# Patient Record
Sex: FEMALE | Race: WHITE | NOT HISPANIC OR LATINO | Employment: FULL TIME | URBAN - METROPOLITAN AREA
[De-identification: names, ages, dates, MRNs, and addresses within clinical notes are randomized per-mention and may not be internally consistent; named-entity substitution may affect disease eponyms.]

---

## 2018-12-12 ENCOUNTER — HOSPITAL ENCOUNTER (EMERGENCY)
Facility: CLINIC | Age: 28
Discharge: HOME | End: 2018-12-12
Attending: INTERNAL MEDICINE | Admitting: EMERGENCY MEDICINE
Payer: COMMERCIAL

## 2018-12-12 VITALS
OXYGEN SATURATION: 96 % | WEIGHT: 175 LBS | SYSTOLIC BLOOD PRESSURE: 114 MMHG | HEART RATE: 76 BPM | RESPIRATION RATE: 16 BRPM | DIASTOLIC BLOOD PRESSURE: 81 MMHG | BODY MASS INDEX: 27.41 KG/M2 | TEMPERATURE: 97.9 F

## 2018-12-12 DIAGNOSIS — T78.40XA ACUTE ALLERGIC REACTION, INITIAL ENCOUNTER: ICD-10-CM

## 2018-12-12 PROCEDURE — 96375 TX/PRO/DX INJ NEW DRUG ADDON: CPT | Performed by: EMERGENCY MEDICINE

## 2018-12-12 PROCEDURE — 96374 THER/PROPH/DIAG INJ IV PUSH: CPT | Performed by: EMERGENCY MEDICINE

## 2018-12-12 PROCEDURE — 99284 EMERGENCY DEPT VISIT MOD MDM: CPT | Mod: Z6 | Performed by: EMERGENCY MEDICINE

## 2018-12-12 PROCEDURE — 25000132 ZZH RX MED GY IP 250 OP 250 PS 637: Performed by: EMERGENCY MEDICINE

## 2018-12-12 PROCEDURE — 25000125 ZZHC RX 250: Performed by: EMERGENCY MEDICINE

## 2018-12-12 PROCEDURE — 99284 EMERGENCY DEPT VISIT MOD MDM: CPT | Mod: 25 | Performed by: EMERGENCY MEDICINE

## 2018-12-12 PROCEDURE — 96361 HYDRATE IV INFUSION ADD-ON: CPT | Performed by: EMERGENCY MEDICINE

## 2018-12-12 PROCEDURE — 25000128 H RX IP 250 OP 636: Performed by: EMERGENCY MEDICINE

## 2018-12-12 RX ORDER — METHYLPREDNISOLONE SODIUM SUCCINATE 125 MG/2ML
125 INJECTION, POWDER, LYOPHILIZED, FOR SOLUTION INTRAMUSCULAR; INTRAVENOUS ONCE
Status: COMPLETED | OUTPATIENT
Start: 2018-12-12 | End: 2018-12-12

## 2018-12-12 RX ORDER — ONDANSETRON 2 MG/ML
4 INJECTION INTRAMUSCULAR; INTRAVENOUS EVERY 30 MIN PRN
Status: DISCONTINUED | OUTPATIENT
Start: 2018-12-12 | End: 2018-12-13 | Stop reason: HOSPADM

## 2018-12-12 RX ORDER — DIPHENHYDRAMINE HYDROCHLORIDE 50 MG/ML
50 INJECTION INTRAMUSCULAR; INTRAVENOUS ONCE
Status: COMPLETED | OUTPATIENT
Start: 2018-12-12 | End: 2018-12-12

## 2018-12-12 RX ORDER — SODIUM CHLORIDE 9 MG/ML
1000 INJECTION, SOLUTION INTRAVENOUS CONTINUOUS
Status: DISCONTINUED | OUTPATIENT
Start: 2018-12-12 | End: 2018-12-13 | Stop reason: HOSPADM

## 2018-12-12 RX ORDER — SODIUM CHLORIDE 9 MG/ML
INJECTION, SOLUTION INTRAVENOUS
Status: DISCONTINUED
Start: 2018-12-12 | End: 2018-12-13 | Stop reason: HOSPADM

## 2018-12-12 RX ADMIN — DIPHENHYDRAMINE HYDROCHLORIDE 50 MG: 50 INJECTION, SOLUTION INTRAMUSCULAR; INTRAVENOUS at 21:12

## 2018-12-12 RX ADMIN — ONDANSETRON 4 MG: 2 INJECTION INTRAMUSCULAR; INTRAVENOUS at 21:13

## 2018-12-12 RX ADMIN — METHYLPREDNISOLONE SODIUM SUCCINATE 125 MG: 125 INJECTION, POWDER, FOR SOLUTION INTRAMUSCULAR; INTRAVENOUS at 21:15

## 2018-12-12 RX ADMIN — SODIUM CHLORIDE 1000 ML: 9 INJECTION, SOLUTION INTRAVENOUS at 21:19

## 2018-12-12 RX ADMIN — LIDOCAINE HYDROCHLORIDE 30 MG: 20 SOLUTION ORAL; TOPICAL at 22:54

## 2018-12-12 ASSESSMENT — ENCOUNTER SYMPTOMS
VOMITING: 1
NAUSEA: 1

## 2018-12-12 NOTE — ED AVS SNAPSHOT
[facility], Emergency Department  [address]  [facility] [address]  Phone:  [phone]  Fax:  [phone]                                    Corine Gutierrez   MRN: [id number]    Department:  [facility], Emergency Department   Date of Visit:  12/12/2018           After Visit Summary Signature Page    I have received my discharge instructions, and my questions have been answered. I have discussed any challenges I see with this plan with the nurse or doctor.    ..........................................................................................................................................  Patient/Patient Representative Signature      ..........................................................................................................................................  Patient Representative Print Name and Relationship to Patient    ..................................................               ................................................  Date                                   Time    ..........................................................................................................................................  Reviewed by Signature/Title    ...................................................              ..............................................  Date                                               Time          22EPIC Rev 08/18

## 2018-12-13 NOTE — DISCHARGE INSTRUCTIONS
I recommend you take Benadryl 50 mg every 6-8 hours for the next 2 days  Return if any problems or concerns

## 2018-12-13 NOTE — ED PROVIDER NOTES
History     Chief Complaint   Patient presents with     Allergic Reaction     peanuts, throat swelling and vomiting. ordered mac and cheese and they put paul peanut sauce in it.      HPI  Corine Gutierrez is a 28 year old female with a history of a peanut allergy who presents to the Emergency Department for the evaluation of an allergic state. Patient states that 1 hour prior to arrival she ate a peanut and now complains of hives, a burning sensation in her mouth, nausea and vomiting. Patient states she took 75 mg Benadryl however vomited shortly after. She does have an EpiPen at home however did not use this.    I have reviewed the Medications, Allergies, Past Medical and Surgical History, and Social History in the Switchcam system.  Past Medical History:   Diagnosis Date     Allergic state        Past Surgical History:   Procedure Laterality Date     BIOPSY OF SKIN LESION       RESECT TUMOR LOWER EXTREMITY  4/10/2014    Procedure: RESECT TUMOR LOWER EXTREMITY;  Excision Left Fibula Tumor/exostosis;  Surgeon: Casimiro Rios MD;  Location: [facility] OR     wisdom teeth[         Family History   Problem Relation Age of Onset     Cancer No family hx of         No family history of skin cancer       Social History     Tobacco Use     Smoking status: Never Smoker     Smokeless tobacco: Never Used   Substance Use Topics     Alcohol use: Yes     Alcohol/week: 1.8 oz     Types: 3 Cans of beer per week     Comment: social drinker       No current facility-administered medications for this encounter.      Current Outpatient Medications   Medication     levothyroxine (LEVOTHROID) 50 MCG tablet     acetaminophen-codeine (TYLENOL/CODEINE #3) 300-30 MG per tablet     EPINEPHrine (EPIPEN) 0.3 MG/0.3ML injection     ibuprofen (ADVIL,MOTRIN) 600 MG tablet        Allergies   Allergen Reactions     Peanuts [Nuts] Nausea and Vomiting, Hives and Swelling     Abdominal pain  dizziness       Review of Systems   HENT:        Positive -  "mouth pain \"burning\"   Gastrointestinal: Positive for nausea and vomiting.   Skin: Positive for rash (hives).   All other systems reviewed and are negative.      Physical Exam   BP: (!) 133/98  Pulse: 76  Heart Rate: 72  Temp: 97.9  F (36.6  C)  Resp: 16  Weight: 79.4 kg (175 lb)  SpO2: 100 %      Physical Exam   Constitutional: She is oriented to person, place, and time. She appears well-developed and well-nourished. No distress.   HENT:   Head: Normocephalic and atraumatic.   Mouth/Throat: Oropharynx is clear and moist.   Eyes: Conjunctivae and EOM are normal. Pupils are equal, round, and reactive to light. No scleral icterus.   Neck: Neck supple.   Cardiovascular: Normal rate, regular rhythm and normal heart sounds.   Pulmonary/Chest: Effort normal and breath sounds normal. No respiratory distress. She has no wheezes.   Neurological: She is alert and oriented to person, place, and time. No cranial nerve deficit.   Skin: Skin is warm and dry. Rash noted.   Psychiatric: She has a normal mood and affect. Her behavior is normal.   Nursing note and vitals reviewed.      ED Course        Procedures        Medications   diphenhydrAMINE (BENADRYL) injection 50 mg (50 mg Intravenous Given 12/12/18 2112)   0.9% sodium chloride BOLUS (0 mLs Intravenous Stopped 12/12/18 2215)   methylPREDNISolone sodium succinate (solu-MEDROL) injection 125 mg (125 mg Intravenous Given 12/12/18 2115)   lidocaine VISCOUS (XYLOCAINE) 2 % 15 mL, alum & mag hydroxide-simethicone (MYLANTA ES/MAALOX  ES) 15 mL GI Cocktail (30 mg Oral Given 12/12/18 2254)            Labs Ordered and Resulted from Time of ED Arrival Up to the Time of Departure from the ED - No data to display         Assessments & Plan (with Medical Decision Making)   Corine Gutierrez is a 28 year old female with acute allergic reaction after inadvertently eating something containing peanuts.  Here she received a dose of Solu-Medrol and IV Benadryl. It has been 3 hours now, she feels " fine, has no wheezing, no swelling, no hives.  We will discharge home with recommendation to take 50 mg of Benadryl every 6-8 hours. Return if any problems or concerns.  She did have some epigastric discomfort and nausea and vomiting; her abdominal exam was unconcerning    I have reviewed the nursing notes.    I have reviewed the findings, diagnosis, plan and need for follow up with the patient.       Medication List      There are no discharge medications for this visit.         Final diagnoses:   Acute allergic reaction, initial encounter     I, Phil James, am serving as a trained medical scribe to document services personally performed by Chris Gasca MD, based on the provider's statements to me.   I, Chris Gasca MD, was physically present and have reviewed and verified the accuracy of this note documented by Phil James.    12/12/2018   [facility], [place], EMERGENCY DEPARTMENT     Kvng Gasca MD  12/20/18 1500

## 2022-02-21 ENCOUNTER — ANCILLARY PROCEDURE (OUTPATIENT)
Dept: ULTRASOUND IMAGING | Facility: CLINIC | Age: 32
End: 2022-02-21
Attending: NURSE PRACTITIONER
Payer: COMMERCIAL

## 2022-02-21 ENCOUNTER — TRANSFERRED RECORDS (OUTPATIENT)
Dept: HEALTH INFORMATION MANAGEMENT | Facility: CLINIC | Age: 32
End: 2022-02-21

## 2022-02-21 DIAGNOSIS — E03.9 HYPOTHYROID: ICD-10-CM

## 2022-02-21 LAB
CHOLESTEROL (EXTERNAL): 156 MG/DL
HDLC SERPL-MCNC: 66 MG/DL (ref 35–999)
LDL CHOLESTEROL (EXTERNAL): 79 MG/DL (ref 0–130)
TRIGLYCERIDES (EXTERNAL): 54 MG/DL (ref 20–150)
TSH SERPL-ACNC: 3.26 MIU/L (ref 0.4–4.5)

## 2022-02-21 PROCEDURE — 76536 US EXAM OF HEAD AND NECK: CPT | Performed by: RADIOLOGY

## 2022-02-22 ENCOUNTER — MEDICAL CORRESPONDENCE (OUTPATIENT)
Dept: HEALTH INFORMATION MANAGEMENT | Facility: CLINIC | Age: 32
End: 2022-02-22
Payer: COMMERCIAL

## 2022-02-22 ENCOUNTER — TRANSFERRED RECORDS (OUTPATIENT)
Dept: HEALTH INFORMATION MANAGEMENT | Facility: CLINIC | Age: 32
End: 2022-02-22
Payer: COMMERCIAL

## 2022-02-25 ENCOUNTER — TRANSFERRED RECORDS (OUTPATIENT)
Dept: HEALTH INFORMATION MANAGEMENT | Facility: CLINIC | Age: 32
End: 2022-02-25
Payer: COMMERCIAL

## 2022-02-28 ENCOUNTER — TELEPHONE (OUTPATIENT)
Dept: ENDOCRINOLOGY | Facility: CLINIC | Age: 32
End: 2022-02-28
Payer: COMMERCIAL

## 2022-02-28 NOTE — TELEPHONE ENCOUNTER
Endocrine triage  The scheduled first available endocrine appointment timeframe is acceptable  Genesis Dasilva MD

## 2022-02-28 NOTE — TELEPHONE ENCOUNTER
[facility] Call Center    Phone Message    May a detailed message be left on voicemail: yes     Reason for Call: Appointment Intake    Referring Provider Name: Dr. Nathalie Amaro from [facility]  Diagnosis and/or Symptoms: Hypothyroidism due to Hashimoto's thyroiditis, Thyroid nodule    Pt is scheduled for FNA on 03/11    I was able to schedule pt with Dr. Dasilva on 05/04    Protocols state to send encounter if appt is scheduled over 2 weeks    Please triage    Action Taken: Message routed to:  [facility] ([facility] Endo    Travel Screening: Not Applicable

## 2022-02-28 NOTE — TELEPHONE ENCOUNTER
APPT NOTES: Thyroid Nodule    For Cancer Patients: Need the original operative and surgical pathology reports and all imaging reports/images related to the disease (includes all thyroid US, nuclear thyroid and total body scans, PET scans, chest CT reports since prior to the diagnosis ).   APPT DATE: 5/4/2022   NOTES (FOR ALL VISITS) STATUS DETAILS   OFFICE NOTES from referring provider Received [facility]:  2/22/22 - [facility] OV with Dr. Amaro   OFFICE NOTES from other specialist Care Everywhere [facility]:  2/20/19 - [facility] OV with Dr. Novak  1/9/18 - OBGYN OV with Olya Damon NP  11/24/09 - [facility] OV with DENAE Machado   ED NOTES N/A    OPERATIVE REPORT  (thyroid, pituitary, adrenal, parathyroid)  (All op notes related to diagnoses) N/A    MEDICATION LIST Received    IMAGING      ULTRASOUND (HEAD/NECK)  * Include FNAs Received / Internal [facility]:  3/11/22 - US Thyroid FNA  2/21/22 - US Thyroid    [facility]:  9/1/09 - US Thyroid/Neck   LABS     DIABETES: HBGA1C, CREATININE, FASTING LIPIDS, MICROALBUMIN URINE, POTASSIUM, TSH, T4    THYROID: TSH, T4, CBC, THYRODLONULIN, TOTAL T3, FREE T4, CALCITONIN, CEA Received / Internal [facility]:  2/22/22 - Thyroid Peroxidase  2/22/22 - Thyroglobulin  2/21/22 - TSH, T4  2/21/22 - CBC  2/21/22 - Lipid    [facility]:  5/3/16 - BMP  5/3/16 - Routine UA   PATHOLOGY REPORTS WITH CASE NUMBER  *Surgical path reports for endocrine organs (ovaries, testes, pancreas, etc) Internal [facility]:  3/11/22 - Thyroid FNA (Case: [id number])     Records Requested  02/28/22    Facility  [facility]  Fax: [phone]  [facility]  Fax: [phone]   Outcome * 2/28/22 11:39 AM Faxed req to [facility] for records to be faxed to the clinic. - Perri    * 3/21/22 8 AM Faxed req to  for images to be pushed to [place] PACs. - Perri    * 4/4/22 8:18 AM Images received from  and attached to the patient in PACs. - Perri

## 2022-02-28 NOTE — TELEPHONE ENCOUNTER
New referral  Hypothyroid  Hashimoto and thyroid nodule.   Labs done 2/22 TSH 3.26 ,  and  Thyroid antibodies 11.  Thyroid nodule biopsy will be 3/11 with first visit 5/ 2022 in Endocrine . Is  ok to wait until May to be seen ? Ofelia Calderon RN on 2/28/2022 at 12:35 PM

## 2022-03-11 ENCOUNTER — ANCILLARY PROCEDURE (OUTPATIENT)
Dept: ULTRASOUND IMAGING | Facility: CLINIC | Age: 32
End: 2022-03-11
Attending: INTERNAL MEDICINE
Payer: COMMERCIAL

## 2022-03-11 DIAGNOSIS — E04.1 THYROID NODULE: ICD-10-CM

## 2022-03-11 PROCEDURE — 88173 CYTOPATH EVAL FNA REPORT: CPT | Mod: 26 | Performed by: PATHOLOGY

## 2022-03-11 PROCEDURE — 10005 FNA BX W/US GDN 1ST LES: CPT | Performed by: STUDENT IN AN ORGANIZED HEALTH CARE EDUCATION/TRAINING PROGRAM

## 2022-03-11 PROCEDURE — 88173 CYTOPATH EVAL FNA REPORT: CPT | Mod: TC

## 2022-03-11 PROCEDURE — 88172 CYTP DX EVAL FNA 1ST EA SITE: CPT | Mod: 26 | Performed by: PATHOLOGY

## 2022-03-11 RX ORDER — LIDOCAINE HYDROCHLORIDE 10 MG/ML
5 INJECTION, SOLUTION INFILTRATION; PERINEURAL ONCE
Status: COMPLETED | OUTPATIENT
Start: 2022-03-11 | End: 2022-03-11

## 2022-03-11 RX ADMIN — LIDOCAINE HYDROCHLORIDE 3 ML: 10 INJECTION, SOLUTION INFILTRATION; PERINEURAL at 11:17

## 2022-03-14 LAB
PATH REPORT.COMMENTS IMP SPEC: NORMAL
PATH REPORT.FINAL DX SPEC: NORMAL
PATH REPORT.GROSS SPEC: NORMAL
PATH REPORT.MICROSCOPIC SPEC OTHER STN: NORMAL
PATH REPORT.RELEVANT HX SPEC: NORMAL

## 2022-05-04 ENCOUNTER — TELEPHONE (OUTPATIENT)
Dept: ENDOCRINOLOGY | Facility: CLINIC | Age: 32
End: 2022-05-04

## 2022-05-04 ENCOUNTER — PRE VISIT (OUTPATIENT)
Dept: ENDOCRINOLOGY | Facility: CLINIC | Age: 32
End: 2022-05-04

## 2022-05-04 ENCOUNTER — VIRTUAL VISIT (OUTPATIENT)
Dept: ENDOCRINOLOGY | Facility: CLINIC | Age: 32
End: 2022-05-04
Payer: COMMERCIAL

## 2022-05-04 DIAGNOSIS — E03.8 SUBCLINICAL HYPOTHYROIDISM: ICD-10-CM

## 2022-05-04 DIAGNOSIS — E04.1 THYROID NODULE: Primary | ICD-10-CM

## 2022-05-04 PROCEDURE — 99205 OFFICE O/P NEW HI 60 MIN: CPT | Mod: 95

## 2022-05-04 RX ORDER — LEVOTHYROXINE SODIUM 75 UG/1
75 TABLET ORAL DAILY
Qty: 90 TABLET | Refills: 4 | Status: SHIPPED | OUTPATIENT
Start: 2022-05-04 | End: 2023-05-08 | Stop reason: DRUGHIGH

## 2022-05-04 NOTE — PROGRESS NOTES
Endocrine Consult Video visit note-     Attending Assessment/Plan :     Subclinical hypothyroidism, autoimmune thyroid disease (AITD).  The lab and US appearances are consistent with the AITD .On LT4 50 mc/gday.   Treat to target TSh < 2.5 now, possibly to < 1.8 if planning pregnancy  Increase LT4 to 75 mcg/day. Repeat labs 2  months  I have counseled her on pregnancy and thyroid , for future reference.     Thyroid nodules: The right dominant nodule had benign cytology.  It looks like what we call white peterson in Hashimoto thyroiditis. Given that it is new since 2009 and now 3.3 cm it is possible that further growth would lead to intervention.   Continue to monitor  Repeat US one year and see me afterwards.  Let us know if concern before then .    Addendum  5/1/24 thyroid US compared with  2/21/2022,  9/1/2009   Hypoechoic heterogeneous gland with white peterson nodules  Right nodule  1 iso/hyperechoic, taller than wide  3.1 x 3.4 x 3.6 (19 CM) cm was 2.8 x 3.3 x 3 (13.9 CM3) inferior posterior, new since 2009 - FNAB 3/11/2022 -- the nodule increased 37% since 2022.   Left nodule # 2 hyperechoic  1.1 x 1 x 1.2 cm was 1 x 1.2 x 1.5 cm new since 2009  Left # 3 0.5 x 0.3 x 0.7 cm   Isthmus hyperechoic # 4  0.5 x 0.4 x 0.6 cm   Inferior to left thyroid # 1  0.5 x 0.5 x 0.9 cmwas 0.7 x 0.6 x 0.9 new since 2009    Waking up choking . Discussed.  Could this be due to the thyroid?  Keep an eye on it.    Usually I wouldn't expect nodular goiter to cause choking when it is not substernal. I can see the lower limits of the gland on the US cine so I do not believe this is substernal.     Due to the COVID 19 pandemic this visit was a video visit in order to help prevent spread of infection in this patient and the general population. The patient gave verbal consent for the visit today. I have independently reviewed and interpreted labs, imaging as indicated.     Chart review/prep time 1  [id number]  Visit Start time amwel 1658    Visit Stop time  1252   41__ minutes spent on the date of the encounter doing chart review, history and exam, documentation and further activities as noted above.    Genesis Dasilva MD    Chief complaint:  Corine is a 31 year old female seen in consultation at the request of Dr Nathalie Amaro for thyroid nodule in the context of hypothyroidsim due to Hashimoto thyroiditis  I have reviewed Care Everywhere including [facility] lab reports, imaging reports and provider notes as indicated.      HISTORY OF PRESENT ILLNESS    Hypothyroid diagnosis was in  2009,  age 19.  She recalls that she had been at the 90th percentile for weight since puberty.  She was tired throughout highschool.  When she transitioned from pediatro to adult medicine she was found to have goiter   She has been on the current dose of Levothryxoine since 2009.   Her father noticed the goiter last fall, thought the goiter was getting bigger.  She can kind of feel the right side with swallow.  She also can feel it with her hand.    She has no neck/head position symptoms.  She wakes up choking in the middle of the night - this just started this year - this occurs about once/week.      Endocrine relevant labs are as follows:  8/28/09 TSH 6.06, free T4 0.9  9/24/2009 TSh 6.44, free T4 0.9  10/28/2009 TSh 2.84   2/20/19 TSh 4.01  2/21/2022 TSh 3.26  2/22/2022  (< 9), MELODY 11 (< 1), ferritin 12    Relevant imaging is as follows: (as read by me as it pertains to endocrine relevant organs)  2/21/2022 thyroid US: compared with 9/1/2009   Hypoechoic heterogeneous gland with white peterson nodules  Right nodule  1 iso/hyperechoic, taller than wide 2.8 x 3.3 x 3 inferior posterior, new since 2009 - FNAB 3/11/2022   Left nodule # 2 hyperechoic  1 x 1.2 x 1.5 cm new since 2009  Inferior to left thyroid # 1 0.7 x 0.6 x 0.9 new since 2009    REVIEW OF SYSTEMS  Weight stable for many years  Energy OK  - somewhat tired, could nap most days.    Voice is  normal  Cardiac: negative  Respiratory: negatifve  GI: irregular always ;   Menarche around 16 or 17 years of age.    Menses never regular; she now has nexplanon;   No planned pregnancy in the near future.    10 system ROS otherwise as per the HPI or negative    Past Medical History  Past Medical History:   Diagnosis Date    Allergic state     Premenstrual dysphoric disorder     Subclinical hypothyroidism 2009     Past Surgical History:   Procedure Laterality Date    BIOPSY OF SKIN LESION      RESECT TUMOR LOWER EXTREMITY  4/10/2014    Procedure: RESECT TUMOR LOWER EXTREMITY;  Excision Left Fibula Tumor/exostosis;  Surgeon: Casimiro Rios MD;  Location: [facility] OR    wisdom teeth[         Medications  Current Outpatient Medications   Medication Sig Dispense Refill    EPINEPHrine (EPIPEN) 0.3 MG/0.3ML injection Inject 0.3 mg into the muscle once as needed      ibuprofen (ADVIL,MOTRIN) 600 MG tablet Take 1 tablet (600 mg) by mouth every 8 hours as needed for moderate pain 20 tablet 0    levothyroxine (SYNTHROID/LEVOTHROID) 75 MCG tablet Take 1 tablet (75 mcg) by mouth daily 90 tablet 4    acetaminophen-codeine (TYLENOL/CODEINE #3) 300-30 MG per tablet Take 1-2 tablets by mouth every 6 hours as needed for pain 12 tablet 0       Allergies  Allergies   Allergen Reactions    Peanut-Derived Hives    Peanuts [Nuts] Nausea and Vomiting, Hives and Swelling     Abdominal pain  dizziness       Family History  Family History   Problem Relation Age of Onset    Diabetes Type 2  Father     Thyroid Disease Maternal Grandfather     Colon Cancer Maternal Grandfather     Diabetes Type 1 Paternal Grandmother     Thyroid Disease Paternal Aunt     Cancer No family hx of         No family history of skin cancer     Social History  Social History     Tobacco Use    Smoking status: Never Smoker    Smokeless tobacco: Never Used   Substance Use Topics    Alcohol use: Yes     Alcohol/week: 3.0 standard drinks     Types: 3 Cans of beer per  "week     Comment: social drinker    Drug use: No     Was in grad school for doctorate in nursing; 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   BP Readings from Last 1 Encounters:   12/12/18 114/81      Pulse Readings from Last 1 Encounters:   12/12/18 76      Resp Readings from Last 1 Encounters:   12/12/18 16      Temp Readings from Last 1 Encounters:   12/12/18 97.9  F (36.6  C) (Oral)      SpO2 Readings from Last 1 Encounters:   12/12/18 96%      Wt Readings from Last 1 Encounters:   12/12/18 79.4 kg (175 lb)      Ht Readings from Last 1 Encounters:   05/03/16 1.702 m (5' 7\")     GENERAL: pleasant young woman in no distress; I can see from neck up  SKIN: Visible skin clear. No significant rash, abnormal pigmentation or lesions.  EYES: glasses; Eyes grossly normal to inspection.  No discharge or erythema, or obvious scleral/conjunctival abnormalities.  NECK: visible goiter is subtle;   RESP: No audible wheeze, cough, or visible cyanosis.  No visible retractions or increased work of breathing.    NEURO: Awake, alert, responds appropriately to questions.  Mentation and speech fluent.  PSYCH:affect normal and appearance well-groomed.    DATA REVIEW    ENDO THYROID LABS-[facility] Latest Ref Rng & Units 2/21/2022   TSH (EXTERNAL) 0.40 - 4.50 mIU/L 3.26     3/11/2022 11:21 AM    Status: Final result    Visible to patient: No (inaccessible in MyChart)    0 Result Notes    Component    Final Diagnosis   Specimen A                 Interpretation:                  Benign  Consistent with a benign nodule (includes adenomatoid nodule, colloid nodule, etc.)     The [place] implied risk of malignancy and recommended clinical management:  Benign has a 0-3% risk of malignancy, recommended management is clinical follow-up.                  Adequacy:                 Satisfactory for evaluation         Electronically signed by Bryon Hall III, MD on " 3/14/2022 at  1:03 PM   Comment           Clinical Information    Thyroid Rt inf #1, 3.3cm   Rapid Onsite Evaluation    FNA Performance:   Fine needle aspiration was not performed by [facility] staff.     Aspirate immediate study/adequacy:  HENRIQUE MARTINEZ KHALID, MD, attest that I immediately examined smears while the procedure was underway and determined or confirmed the adequacy of the specimens via telepathology.     It is of note that the final assessment and report may be performed and signed by a different pathologist.     Onsite adequacy/interpretation:  A: adequate         Gross Description    A. Thyroid, Lower Lobe, Right, Inferior #1 3.3cm, Fine Needle Aspirate:  Received are 3 fixed slides, processed for Pap stain, and 3 air dried slides, processed for Diff Quik stain. Afirma tube held.                     Microscopic Description    Microscopic examination was performed.     A resident/fellow in an ACGME accredited training program was involved in the initial review, preparation, and/or interpretation of this case.  Kyree MARTINEZ MD, as the senior physician, attest that I: (i) reviewed patient clinical records if indicated; (ii) reviewed relevant lab test results; (iii) examined the relevant preparation(s) for the specimen(s); and (iv) agree with the report, diagnosis(es), and interpretation as documented by the resident/fellow and edited/confirmed by me. Reporting resident/fellow: [email or web address]   Performing Labs    The technical component of this testing was completed at [facility]

## 2022-05-04 NOTE — LETTER
5/4/2022       RE: Corine Gutierrez  [address]  [facility] [address]     Dear Colleague,    Thank you for referring your patient, Corine Gutierrez, to the [facility] [place] [facility]. Please see a copy of my visit note below.    Endocrine Consult Video visit note-     Attending Assessment/Plan :     Subclinical hypothyroidism, autoimmune thyroid disease (AITD).  The lab and US appearances are consistent with the AITD .On LT4 50 mc/gday.   Treat to target TSh < 2.5 now, possibly to < 1.8 if planning pregnancy  Increase LT4 to 75 mcg/day. Repeat labs 2  months  I have counseled her on pregnancy and thyroid , for future reference.     Thyroid nodules: The right dominant nodule had benign cytology.  It looks like what we call white peterson in Hashimoto thyroiditis. Given that it is new since 2009 and now 3.3 cm it is possible that further growth would lead to intervention.   Continue to monitor  Repeat US one year and see me afterwards.  Let us know if concern before then .    Waking up choking . Discussed.  Could this be due to the thyroid?  Keep an eye on it.    Usually I wouldn't expect nodular goiter to cause choking when it is not substernal. I can see the lower limits of the gland on the US cine so I do not believe this is substernal.     Due to the COVID 19 pandemic this visit was a video visit in order to help prevent spread of infection in this patient and the general population. The patient gave verbal consent for the visit today. I have independently reviewed and interpreted labs, imaging as indicated.     Chart review/prep time 1  [id number]  Visit Start time amwel 1654   Visit Stop time  1711   41__ minutes spent on the date of the encounter doing chart review, history and exam, documentation and further activities as noted above.    Genesis Dasilva MD    Chief complaint:  Corine is a 31 year old  female seen in consultation at the request of Dr Nathalie Amaro for thyroid nodule in the context of hypothyroidsim due to Hashimoto thyroiditis  I have reviewed Care Everywhere including [facility] lab reports, imaging reports and provider notes as indicated.      HISTORY OF PRESENT ILLNESS    Hypothyroid diagnosis was in  2009,  age 19.  She recalls that she had been at the 90th percentile for weight since puberty.  She was tired throughout highschool.  When she transitioned from pediatro to adult medicine she was found to have goiter   She has been on the current dose of Levothryxoine since 2009.   Her father noticed the goiter last fall, thought the goiter was getting bigger.  She can kind of feel the right side with swallow.  She also can feel it with her hand.    She has no neck/head position symptoms.  She wakes up choking in the middle of the night - this just started this year - this occurs about once/week.      Endocrine relevant labs are as follows:  8/28/09 TSH 6.06, free T4 0.9  9/24/2009 TSh 6.44, free T4 0.9  10/28/2009 TSh 2.84   2/20/19 TSh 4.01  2/21/2022 TSh 3.26  2/22/2022  (< 9), MELODY 11 (< 1), ferritin 12    Relevant imaging is as follows: (as read by me as it pertains to endocrine relevant organs)  2/21/2022 thyroid US: compared with 9/1/2009   Hypoechoic heterogeneous gland with white peterson nodules  Right nodule  1 iso/hyperechoic, taller than wide 2.8 x 3.3 x 3 inferior posterior, new since 2009 - FNAB 3/11/2022   Left nodule # 2 hyperechoic  1 x 1.2 x 1.5 cm new since 2009  Inferior to left thyroid # 1 0.7 x 0.6 x 0.9 new since 2009    REVIEW OF SYSTEMS  Weight stable for many years  Energy OK  - somewhat tired, could nap most days.    Voice is normal  Cardiac: negative  Respiratory: negatifve  GI: irregular always ;   Menarche around 16 or 17 years of age.    Menses never regular; she now has nexplanon;   No planned pregnancy in the near future.    10 system ROS otherwise as  per the HPI or negative    Past Medical History  Past Medical History:   Diagnosis Date     Allergic state      Premenstrual dysphoric disorder      Subclinical hypothyroidism 2009     Past Surgical History:   Procedure Laterality Date     BIOPSY OF SKIN LESION       RESECT TUMOR LOWER EXTREMITY  4/10/2014    Procedure: RESECT TUMOR LOWER EXTREMITY;  Excision Left Fibula Tumor/exostosis;  Surgeon: Casimiro Rios MD;  Location: [facility] OR     wisdom teeth[         Medications  Current Outpatient Medications   Medication Sig Dispense Refill     EPINEPHrine (EPIPEN) 0.3 MG/0.3ML injection Inject 0.3 mg into the muscle once as needed       ibuprofen (ADVIL,MOTRIN) 600 MG tablet Take 1 tablet (600 mg) by mouth every 8 hours as needed for moderate pain 20 tablet 0     levothyroxine (SYNTHROID/LEVOTHROID) 75 MCG tablet Take 1 tablet (75 mcg) by mouth daily 90 tablet 4     acetaminophen-codeine (TYLENOL/CODEINE #3) 300-30 MG per tablet Take 1-2 tablets by mouth every 6 hours as needed for pain 12 tablet 0       Allergies  Allergies   Allergen Reactions     Peanut-Derived Hives     Peanuts [Nuts] Nausea and Vomiting, Hives and Swelling     Abdominal pain  dizziness       Family History  Family History   Problem Relation Age of Onset     Diabetes Type 2  Father      Thyroid Disease Maternal Grandfather      Colon Cancer Maternal Grandfather      Diabetes Type 1 Paternal Grandmother      Thyroid Disease Paternal Aunt      Cancer No family hx of         No family history of skin cancer     Social History  Social History     Tobacco Use     Smoking status: Never Smoker     Smokeless tobacco: Never Used   Substance Use Topics     Alcohol use: Yes     Alcohol/week: 3.0 standard drinks     Types: 3 Cans of beer per week     Comment: social drinker     Drug use: No     Was in grad school for doctorate in nursing; 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   BP Readings from Last 1 Encounters:   12/12/18 114/81      Pulse Readings from Last 1 Encounters:   12/12/18 76      Resp Readings from Last 1 Encounters:   12/12/18 16      Temp Readings from Last 1 Encounters:   12/12/18 97.9  F (36.6  C) (Oral)      SpO2 Readings from Last 1 Encounters:   12/12/18 96%      Wt Readings from Last 1 Encounters:   12/12/18 79.4 kg (175 lb)      Ht Readings from Last 1 Encounters:   05/03/16 1.702 m (5' 7\")     GENERAL: pleasant young woman in no distress; I can see from neck up  SKIN: Visible skin clear. No significant rash, abnormal pigmentation or lesions.  EYES: glasses; Eyes grossly normal to inspection.  No discharge or erythema, or obvious scleral/conjunctival abnormalities.  NECK: visible goiter is subtle;   RESP: No audible wheeze, cough, or visible cyanosis.  No visible retractions or increased work of breathing.    NEURO: Awake, alert, responds appropriately to questions.  Mentation and speech fluent.  PSYCH:affect normal and appearance well-groomed.    DATA REVIEW    ENDO THYROID LABS-P Latest Ref Rng & Units 2/21/2022   TSH (EXTERNAL) 0.40 - 4.50 mIU/L 3.26      3/11/2022 11:21 AM     Status: Final result     Visible to patient: No (inaccessible in MyChart)     0 Result Notes    Component    Final Diagnosis   Specimen A                 Interpretation:                  Benign  Consistent with a benign nodule (includes adenomatoid nodule, colloid nodule, etc.)     The [place] implied risk of malignancy and recommended clinical management:  Benign has a 0-3% risk of malignancy, recommended management is clinical follow-up.                  Adequacy:                 Satisfactory for evaluation         Electronically signed by Bryon Hall III, MD on 3/14/2022 at  1:03 PM   Comment           Clinical Information    Thyroid Rt inf #1, 3.3cm   Rapid Onsite Evaluation    FNA Performance:   Fine needle aspiration was not performed by [facility] " staff.     Aspirate immediate study/adequacy:  IHENRIQUE KHALID, MD, attest that I immediately examined smears while the procedure was underway and determined or confirmed the adequacy of the specimens via telepathology.     It is of note that the final assessment and report may be performed and signed by a different pathologist.     Onsite adequacy/interpretation:  A: adequate         Gross Description    A. Thyroid, Lower Lobe, Right, Inferior #1 3.3cm, Fine Needle Aspirate:  Received are 3 fixed slides, processed for Pap stain, and 3 air dried slides, processed for Diff Quik stain. Afirma tube held.                     Microscopic Description    Microscopic examination was performed.     A resident/fellow in an ACGME accredited training program was involved in the initial review, preparation, and/or interpretation of this case.  Kyree MARTINEZ MD, as the senior physician, attest that I: (i) reviewed patient clinical records if indicated; (ii) reviewed relevant lab test results; (iii) examined the relevant preparation(s) for the specimen(s); and (iv) agree with the report, diagnosis(es), and interpretation as documented by the resident/fellow and edited/confirmed by me. Reporting resident/fellow: [email or web address]   Performing Labs    The technical component of this testing was completed at [facility]              Corine Gutierrez is being evaluated via a billable video visit.    How would you like to obtain your AVS? MyChart  For the video visit, send the invitation by: Send to e-mail at: [email or web address]  Will anyone else be joining your video visit? No

## 2022-05-04 NOTE — PATIENT INSTRUCTIONS
Treat to target TSH < 2.5  Increase levothyroxine to 75 mcg/day  Repeat labs in 2 months .  [facility] (or [employer] [facility] [employer] if you sign up for it)    Repeat thyroid ultrasound one year and see me afterwards.   Let us know if concern before then .  Let us know if the nocturnal choking gets worse.

## 2022-05-04 NOTE — TELEPHONE ENCOUNTER
CC CHECK OUT NEEDED - Ultrasound needs to be scheduled. - Radiology number given.   Declined to schedule labs at this time.   1 year follow up scheduled.  Taylor Myhre,VF

## 2022-05-04 NOTE — PROGRESS NOTES
Corine Gutierrez is being evaluated via a billable video visit.    How would you like to obtain your AVS? MyChart  For the video visit, send the invitation by: Send to e-mail at: [email or web address]  Will anyone else be joining your video visit? No

## 2022-11-01 ENCOUNTER — LAB REQUISITION (OUTPATIENT)
Dept: LAB | Facility: CLINIC | Age: 32
End: 2022-11-01

## 2022-11-01 PROCEDURE — 86481 TB AG RESPONSE T-CELL SUSP: CPT | Performed by: INTERNAL MEDICINE

## 2022-11-03 LAB
GAMMA INTERFERON BACKGROUND BLD IA-ACNC: 0.05 IU/ML
M TB IFN-G BLD-IMP: NEGATIVE
M TB IFN-G CD4+ BCKGRND COR BLD-ACNC: 9.95 IU/ML
MITOGEN IGNF BCKGRD COR BLD-ACNC: 0.01 IU/ML
MITOGEN IGNF BCKGRD COR BLD-ACNC: 0.04 IU/ML
QUANTIFERON MITOGEN: 10 IU/ML
QUANTIFERON NIL TUBE: 0.05 IU/ML
QUANTIFERON TB1 TUBE: 0.09 IU/ML
QUANTIFERON TB2 TUBE: 0.06

## 2022-11-23 DIAGNOSIS — N89.8 VAGINAL ODOR: Primary | ICD-10-CM

## 2022-11-23 RX ORDER — CLINDAMYCIN HCL 300 MG
300 CAPSULE ORAL 2 TIMES DAILY
Qty: 14 CAPSULE | Refills: 0 | Status: SHIPPED | OUTPATIENT
Start: 2022-11-23 | End: 2022-11-30

## 2023-02-16 ENCOUNTER — OFFICE VISIT (OUTPATIENT)
Dept: MIDWIFE SERVICES | Facility: CLINIC | Age: 33
End: 2023-02-16
Payer: COMMERCIAL

## 2023-02-16 DIAGNOSIS — Z30.46 ENCOUNTER FOR NEXPLANON REMOVAL: ICD-10-CM

## 2023-02-16 DIAGNOSIS — Z30.09 GENERAL COUNSELING AND ADVICE ON CONTRACEPTIVE MANAGEMENT: Primary | ICD-10-CM

## 2023-02-16 PROCEDURE — 11982 REMOVE DRUG IMPLANT DEVICE: CPT | Performed by: ADVANCED PRACTICE MIDWIFE

## 2023-02-16 RX ORDER — ETONOGESTREL AND ETHINYL ESTRADIOL VAGINAL RING .015; .12 MG/D; MG/D
1 RING VAGINAL
Qty: 3 EACH | Refills: 4 | Status: SHIPPED | OUTPATIENT
Start: 2023-02-16 | End: 2023-05-26

## 2023-02-16 NOTE — PROGRESS NOTES
Nexplanon Removal:     Is a pregnancy test required: No.  Was a consent obtained?  Yes    Corine Gutierrez is here for removal of etonogestrel implant Nexplanon/Implanon    Indication: DUB/desires alternative method    Preoperative Diagnosis: etonogestrel implant  Postoperative Diagnosis: etonogestrel implant removed    Technique: On the left arm  Skin prep Betadine  Anesthesia 1% lidocaine, with epi  Procedure: Small incision (<5mm) was made at distal end of palpable implant, curved hemostat or mosquito forceps was used to isolate the implant and bring it to the incision, the fibrous capsule containing the implant  was incised and the Implant was removed intact.    EBL: minimal  Complications:  No  Tolerance:  Pt tolerated procedure well and was in stable condition.   Dressing:    A pressure bandage was placed for the next 12-24 hours.    Contraception was discussed and patient chose the following method NUVARing  Back up method for 7 days      Follow up: Pt was instructed to call if bleeding, severe pain or foul smell.     BROOKE Marion CNM

## 2023-05-03 ENCOUNTER — ANCILLARY PROCEDURE (OUTPATIENT)
Dept: ULTRASOUND IMAGING | Facility: CLINIC | Age: 33
End: 2023-05-03
Payer: COMMERCIAL

## 2023-05-03 ENCOUNTER — LAB (OUTPATIENT)
Dept: LAB | Facility: CLINIC | Age: 33
End: 2023-05-03
Payer: COMMERCIAL

## 2023-05-03 DIAGNOSIS — E04.1 THYROID NODULE: ICD-10-CM

## 2023-05-03 DIAGNOSIS — E03.8 SUBCLINICAL HYPOTHYROIDISM: ICD-10-CM

## 2023-05-03 LAB
T4 FREE SERPL-MCNC: 1.12 NG/DL (ref 0.9–1.7)
TSH SERPL DL<=0.005 MIU/L-ACNC: 4.52 UIU/ML (ref 0.3–4.2)

## 2023-05-03 PROCEDURE — 84439 ASSAY OF FREE THYROXINE: CPT | Performed by: PATHOLOGY

## 2023-05-03 PROCEDURE — 76536 US EXAM OF HEAD AND NECK: CPT | Performed by: SURGERY

## 2023-05-03 PROCEDURE — 84443 ASSAY THYROID STIM HORMONE: CPT | Performed by: PATHOLOGY

## 2023-05-03 PROCEDURE — 36415 COLL VENOUS BLD VENIPUNCTURE: CPT | Performed by: PATHOLOGY

## 2023-05-08 ENCOUNTER — VIRTUAL VISIT (OUTPATIENT)
Dept: ENDOCRINOLOGY | Facility: CLINIC | Age: 33
End: 2023-05-08
Payer: COMMERCIAL

## 2023-05-08 DIAGNOSIS — E03.8 SUBCLINICAL HYPOTHYROIDISM: Primary | ICD-10-CM

## 2023-05-08 DIAGNOSIS — E04.1 THYROID NODULE: ICD-10-CM

## 2023-05-08 PROCEDURE — 99214 OFFICE O/P EST MOD 30 MIN: CPT | Mod: VID

## 2023-05-08 RX ORDER — LEVOTHYROXINE SODIUM 100 UG/1
100 TABLET ORAL DAILY
Qty: 90 TABLET | Refills: 4 | Status: SHIPPED | OUTPATIENT
Start: 2023-05-08 | End: 2024-05-13

## 2023-05-08 ASSESSMENT — ENCOUNTER SYMPTOMS
HALLUCINATIONS: 0
WEIGHT GAIN: 0
NIGHT SWEATS: 0
DECREASED APPETITE: 0
POLYPHAGIA: 0
CHILLS: 0
FEVER: 0
WEIGHT LOSS: 0
INCREASED ENERGY: 0
FATIGUE: 1
ALTERED TEMPERATURE REGULATION: 0
POLYDIPSIA: 0

## 2023-05-08 NOTE — PROGRESS NOTES
Endocrine  Video visit note-     Attending Assessment/Plan :     Subclinical hypothyroidism, autoimmune thyroid disease (AITD).    Treat to target TSh < 2.5 now, possibly to < 1.8 if planning pregnancy  Not at target on current   LT4  75 mcg/day.  Increase to 100 mcg/day. Repeat labs in 2 months  I have counseled her on pill tray    Thyroid nodules: The right dominant nodule had benign cytology.  It looks like what we call white peterson in Hashimoto thyroiditis.  is new since 2009 and now increased 24% since the prior US in 2022    Family planning.  I have counseled her on the above in the context of possible future pregnancy.   Let me know immediately if diagnosis pregnancy.     Due to the COVID 19 pandemic this visit was a video visit. The patient gave verbal consent for the visit today.    I have independently reviewed and interpreted labs, imaging as indicated.     Distant Location (provider location):  Off-site  Mode of Communication:  Video Conference via [email or web address]  Chart review/prep time 1  [id number]  Visit Start time  1535   Visit Stop time 1542   23__ minutes spent on the date of the encounter doing chart review, history and exam, documentation and further activities as noted above.    Genesis Dasilva MD    Chief complaint:   HISTORY OF PRESENT ILLNESS  Cari returns for follow up of Subclinical hypothyroidism, autoimmune thyroid disease (AITD), thyroid ndoule.  I have seen her once before a year ago.  At that time she was on LT4 50 and we increased to 75 mcg/day.  She was to have repeat labs in 2 months ,but didn't have them until 5/3/2023.  She had repeat US prior to this appt.      Hypothyroid diagnosis was in  2009,  age 19.  She recalls that she had been at the 90th percentile for weight since puberty.  She was tired throughout high school.  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 she was found to have goiter     Endocrine relevant labs are as follows:  8/28/09 TSH 6.06, free T4  0.9  9/24/2009 TSh 6.44, free T4 0.9  10/28/2009 TSh 2.84   2/20/19 TSh 4.01  2/21/2022 TSh 3.26  2/22/2022  (< 9), MELODY 11 (< 1), ferritin 12  5/3/23 TSh 4.52, free T4 1.12    Relevant imaging is as follows: (as read by me as it pertains to endocrine relevant organs)  5/3/23 thyroid US compared with 2/21/2022,  9/1/2009   Hypoechoic heterogeneous gland with white peterson nodules  Right nodule  1 iso/hyperechoic, 2.9 x 3.3 x 3.6 cm (17.2 cm3) was  2.8 x 3.3 x 3 (13.9 cm3) inferior posterior, new since 2009 - FNAB 3/11/2022  Cytology benign   Left nodule # 2 hyperechoic  1.2 x 1.3 x 1.5 cm was 1 x 1.2 x 1.5 cm new since 2009  Inferior to left thyroid # 1 0.7 x 0.7 x 0.9 cm was 0.7 x 0.6 x 0.9 new since 2009    REVIEW OF SYSTEMS    Can feel the goiter with her hand and from the inside.    No longer has nocturnal choking.   nexplanon removed and changed to ring.  Now has headache problems and going to stop it  Might want to get pregnant in January    Answers for HPI/ROS submitted by the patient on 5/8/2023  General Symptoms: Yes  Skin Symptoms: No  HENT Symptoms: Yes  EYE SYMPTOMS: No  HEART SYMPTOMS: No  LUNG SYMPTOMS: No  INTESTINAL SYMPTOMS: No  URINARY SYMPTOMS: No  GYNECOLOGIC SYMPTOMS: No  BREAST SYMPTOMS: No  SKELETAL SYMPTOMS: No  BLOOD SYMPTOMS: No  NERVOUS SYSTEM SYMPTOMS: No  MENTAL HEALTH SYMPTOMS: Yes  Fever: No  Loss of appetite: No  Weight loss: No  Weight gain: No  Fatigue: Yes  Night sweats: No  Chills: No  Increased stress: Yes  Excessive hunger: No  Excessive thirst: No  Feeling hot or cold when others believe the temperature is normal: No  Loss of height: No  Post-operative complications: No  Surgical site pain: No  Hallucinations: No  Change in or Loss of Energy: No  Hyperactivity: No  Confusion: No        Past Medical History  Past Medical History:   Diagnosis Date     Allergic state      Premenstrual dysphoric disorder (PMDD)      Subclinical hypothyroidism 2009     Past Surgical History:    Procedure Laterality Date     BIOPSY OF SKIN LESION       RESECT TUMOR LOWER EXTREMITY  4/10/2014    Procedure: RESECT TUMOR LOWER EXTREMITY;  Excision Left Fibula Tumor/exostosis;  Surgeon: Casimiro Rios MD;  Location: [facility] OR     wisdom teeth[         Medications  Current Outpatient Medications   Medication Sig Dispense Refill     acetaminophen-codeine (TYLENOL/CODEINE #3) 300-30 MG per tablet Take 1-2 tablets by mouth every 6 hours as needed for pain 12 tablet 0     EPINEPHrine (EPIPEN) 0.3 MG/0.3ML injection Inject 0.3 mg into the muscle once as needed       etonogestrel-ethinyl estradiol (NUVARING) 0.12-0.015 MG/24HR vaginal ring Place 1 each vaginally every 28 days 3 each 4     ibuprofen (ADVIL,MOTRIN) 600 MG tablet Take 1 tablet (600 mg) by mouth every 8 hours as needed for moderate pain 20 tablet 0     levothyroxine (SYNTHROID/LEVOTHROID) 75 MCG tablet Take 1 tablet (75 mcg) by mouth daily 90 tablet 4     etonogestrel implant removed 2/16/23    Allergies  Allergies   Allergen Reactions     Peanut-Containing Drug Products Hives     Peanuts [Nuts] Nausea and Vomiting, Hives and Swelling     Abdominal pain  dizziness       Family History  Family History   Problem Relation Age of Onset     Diabetes Type 2  Father      Thyroid Disease Maternal Grandfather      Colon Cancer Maternal Grandfather      Diabetes Type 1 Paternal Grandmother      Thyroid Disease Paternal Aunt      Cancer No family hx of         No family history of skin cancer     Social History  Social History     Tobacco Use     Smoking status: Never     Smokeless tobacco: Never   Substance Use Topics     Alcohol use: Yes     Alcohol/week: 3.0 standard drinks of alcohol     Types: 3 Cans of beer per week     Comment: social drinker     Drug use: No     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   BP Readings from Last 1  "Encounters:   12/12/18 114/81      Pulse Readings from Last 1 Encounters:   12/12/18 76      Resp Readings from Last 1 Encounters:   12/12/18 16      Temp Readings from Last 1 Encounters:   12/12/18 97.9  F (36.6  C) (Oral)      SpO2 Readings from Last 1 Encounters:   12/12/18 96%      Wt Readings from Last 1 Encounters:   12/12/18 79.4 kg (175 lb)      Ht Readings from Last 1 Encounters:   05/03/16 1.702 m (5' 7\")     GENERAL: pleasant young woman in no distress; I can see from lower trunk up,    SKIN: Visible skin clear. No significant rash, abnormal pigmentation or lesions.  EYES: glasses; Eyes grossly normal to inspection.   NECK: visible goiter is strongly suggested - symmetrical appearing   RESP: No audible wheeze, cough, or  increased work of breathing.    NEURO: Awake, alert, responds appropriately to questions.  Mentation and speech fluent.  PSYCH:affect normal but a little low energy/ tired appearing  and appearance well-groomed.    DATA REVIEW    US THYROID 5/3/2023 8:52 AM     COMPARISON: 3/11/2022, 2/21/2022     HISTORY: Thyroid nodule previously biopsied nodule 1.     FINDINGS:   Thyroid parenchyma: Heterogeneous  The right lobe of the thyroid measures: 6.8 x 3.2 x 3.6 cm  The left lobe of the thyroid measures: 4.6 x 2.0 x 2.3 cm  The thyroid isthmus measures: 0.6 cm      Nodule 1:  Lobe: Right  Location: Inferior  Size: 3.6 x 2.9 x 3.3 cm  Composition: Solid or almost completely solid (2 points)  Echogenicity: Hyperechoic or isoechoic (1 point)  Shape: Wider than tall (0 points)  Margin: Smooth (0 points)  Echogenic Foci: None or large comet tail artifact (0 points)  Stability: No significant change in size  TIRADS: TR3 (3 points)   Previously biopsied consistent with a benign nodule.     Nodule 2:  Lobe: Left  Location: Inferior  Size: 1.5 x 1.2 x 1.3 cm  Composition: Solid or almost completely solid (2 points)  Echogenicity: Hyperechoic or isoechoic (1 point)  Shape: Wider than tall (0 " points)  Margin: Smooth (0 points)  Echogenic Foci: None or large comet tail artifact (0 points)  Stability: Minimally Enlarging, previously 1.5 x 1.0 x 1.2 cm  TIRADS: TR3 (3 points)         Stable multiple hypoechoic, reniform nodules inferior to the left  thyroid gland measuring up to 0.9 x 0.7 x 0.7 cm.                                                                         Impression:  1.  Minimally increased left thyroid nodule (#2) characterized as  TIRADS-3. Recommend follow-up ultrasound in one year as per TIRADS  recommendations.  2.  Stable right thyroid nodule, previously biopsied and benign.  3.  Stable inferior left thyroid nodules favoring lymph nodes.        ACR TI-RADS recommendations  TR2 (2 points) & TR1 (0 points) -No FNA or follow-up  TR3 (3 points) - FNA if ? 2.5cm, follow-up if 1.5 -2.4 cm in 1, 3 and  5 years  TR4 (4-6 points) - FNA if ? 1.5cm, follow-up if 1 -1.4 cm in 1, 2, 3  and 5 years  TR5 (?7 points) - FNA if ? 1cm, follow-up if 0.5 -0.9 cm every year  for 5 years      NELLY WOODY MD

## 2023-05-08 NOTE — NURSING NOTE
Is the patient currently in the state of [place]? YES    Visit mode:VIDEO    If the visit is dropped, the patient can be reconnected by: VIDEO VISIT: Text to cell phone: [phone]    Will anyone else be joining the visit? NO      How would you like to obtain your AVS? MyChart    Are changes needed to the allergy or medication list? NO    Reason for visit: Follow Up and Video Visit

## 2023-05-08 NOTE — PATIENT INSTRUCTIONS
Increase levothyroxine from 75 to 100 mcg/day.   Labs in 2 months     Using a pill tray can help you keep track of your medication.  Specifically, if you get a pill tray that has all days of the week (Sunday-Saturday) and also 4 boxes for each day (often labeled as breafkast, lunch, dinner and bedtime) you can use the box to fill your once/day  pills for a whole month.  If you miss a levothyroxine dose you can take 2 the next day.       US and labs just before next appt in one year

## 2023-05-08 NOTE — LETTER
5/8/2023       RE: Corine Gutierrez  [address] [facility] [address]     Dear Colleague,    Thank you for referring your patient, Corine Gutirerez, to the [facility] [place] at [facility]. Please see a copy of my visit note below.    Endocrine  Video visit note-     Attending Assessment/Plan :     Subclinical hypothyroidism, autoimmune thyroid disease (AITD).    Treat to target TSh < 2.5 now, possibly to < 1.8 if planning pregnancy  Not at target on current   LT4  75 mcg/day.  Increase to 100 mcg/day. Repeat labs in 2 months  I have counseled her on pill tray    Thyroid nodules: The right dominant nodule had benign cytology.  It looks like what we call white peterson in Hashimoto thyroiditis.  is new since 2009 and now increased 24% since the prior US in 2022    Family planning.  I have counseled her on the above in the context of possible future pregnancy.   Let me know immediately if diagnosis pregnancy.     Due to the COVID 19 pandemic this visit was a video visit. The patient gave verbal consent for the visit today.    I have independently reviewed and interpreted labs, imaging as indicated.     Distant Location (provider location):  Off-site  Mode of Communication:  Video Conference via [employer]  Chart review/prep time 1  [id number]  Visit Start time  1535   Visit Stop time 1542   23__ minutes spent on the date of the encounter doing chart review, history and exam, documentation and further activities as noted above.    Genesis Dasilva MD    Chief complaint:   HISTORY OF PRESENT ILLNESS  Cari returns for follow up of Subclinical hypothyroidism, autoimmune thyroid disease (AITD), thyroid ndoule.  I have seen her once before a year ago.  At that time she was on LT4 50 and we increased to 75 mcg/day.  She was to have repeat labs in 2 months ,but didn't have them until 5/3/2023.  She had repeat US prior to this appt.       Hypothyroid diagnosis was in  2009,  age 19.  She recalls that she had been at the 90th percentile for weight since puberty.  She was tired throughout high school.  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 she was found to have goiter     Endocrine relevant labs are as follows:  8/28/09 TSH 6.06, free T4 0.9  9/24/2009 TSh 6.44, free T4 0.9  10/28/2009 TSh 2.84   2/20/19 TSh 4.01  2/21/2022 TSh 3.26  2/22/2022  (< 9), MELODY 11 (< 1), ferritin 12  5/3/23 TSh 4.52, free T4 1.12    Relevant imaging is as follows: (as read by me as it pertains to endocrine relevant organs)  5/3/23 thyroid US compared with 2/21/2022,  9/1/2009   Hypoechoic heterogeneous gland with white peterson nodules  Right nodule  1 iso/hyperechoic, 2.9 x 3.3 x 3.6 cm (17.2 cm3) was  2.8 x 3.3 x 3 (13.9 cm3) inferior posterior, new since 2009 - FNAB 3/11/2022  Cytology benign   Left nodule # 2 hyperechoic  1.2 x 1.3 x 1.5 cm was 1 x 1.2 x 1.5 cm new since 2009  Inferior to left thyroid # 1 0.7 x 0.7 x 0.9 cm was 0.7 x 0.6 x 0.9 new since 2009    REVIEW OF SYSTEMS    Can feel the goiter with her hand and from the inside.    No longer has nocturnal choking.   nexplanon removed and changed to ring.  Now has headache problems and going to stop it  Might want to get pregnant in January    Answers for HPI/ROS submitted by the patient on 5/8/2023  General Symptoms: Yes  Skin Symptoms: No  HENT Symptoms: Yes  EYE SYMPTOMS: No  HEART SYMPTOMS: No  LUNG SYMPTOMS: No  INTESTINAL SYMPTOMS: No  URINARY SYMPTOMS: No  GYNECOLOGIC SYMPTOMS: No  BREAST SYMPTOMS: No  SKELETAL SYMPTOMS: No  BLOOD SYMPTOMS: No  NERVOUS SYSTEM SYMPTOMS: No  MENTAL HEALTH SYMPTOMS: Yes  Fever: No  Loss of appetite: No  Weight loss: No  Weight gain: No  Fatigue: Yes  Night sweats: No  Chills: No  Increased stress: Yes  Excessive hunger: No  Excessive thirst: No  Feeling hot or cold when others believe the temperature is normal: No  Loss of height: No  Post-operative  complications: No  Surgical site pain: No  Hallucinations: No  Change in or Loss of Energy: No  Hyperactivity: No  Confusion: No        Past Medical History  Past Medical History:   Diagnosis Date    Allergic state     Premenstrual dysphoric disorder (PMDD)     Subclinical hypothyroidism 2009     Past Surgical History:   Procedure Laterality Date    BIOPSY OF SKIN LESION      RESECT TUMOR LOWER EXTREMITY  4/10/2014    Procedure: RESECT TUMOR LOWER EXTREMITY;  Excision Left Fibula Tumor/exostosis;  Surgeon: Casimiro Rios MD;  Location: [facility] OR    wisdom teeth[         Medications  Current Outpatient Medications   Medication Sig Dispense Refill    acetaminophen-codeine (TYLENOL/CODEINE #3) 300-30 MG per tablet Take 1-2 tablets by mouth every 6 hours as needed for pain 12 tablet 0    EPINEPHrine (EPIPEN) 0.3 MG/0.3ML injection Inject 0.3 mg into the muscle once as needed      etonogestrel-ethinyl estradiol (NUVARING) 0.12-0.015 MG/24HR vaginal ring Place 1 each vaginally every 28 days 3 each 4    ibuprofen (ADVIL,MOTRIN) 600 MG tablet Take 1 tablet (600 mg) by mouth every 8 hours as needed for moderate pain 20 tablet 0    levothyroxine (SYNTHROID/LEVOTHROID) 75 MCG tablet Take 1 tablet (75 mcg) by mouth daily 90 tablet 4     etonogestrel implant removed 2/16/23    Allergies  Allergies   Allergen Reactions    Peanut-Containing Drug Products Hives    Peanuts [Nuts] Nausea and Vomiting, Hives and Swelling     Abdominal pain  dizziness       Family History  Family History   Problem Relation Age of Onset    Diabetes Type 2  Father     Thyroid Disease Maternal Grandfather     Colon Cancer Maternal Grandfather     Diabetes Type 1 Paternal Grandmother     Thyroid Disease Paternal Aunt     Cancer No family hx of         No family history of skin cancer     Social History  Social History     Tobacco Use    Smoking status: Never    Smokeless tobacco: Never   Substance Use Topics    Alcohol use: Yes     Alcohol/week: 3.0  "standard drinks of alcohol     Types: 3 Cans of beer per week     Comment: social drinker    Drug use: No     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   BP Readings from Last 1 Encounters:   12/12/18 114/81      Pulse Readings from Last 1 Encounters:   12/12/18 76      Resp Readings from Last 1 Encounters:   12/12/18 16      Temp Readings from Last 1 Encounters:   12/12/18 97.9  F (36.6  C) (Oral)      SpO2 Readings from Last 1 Encounters:   12/12/18 96%      Wt Readings from Last 1 Encounters:   12/12/18 79.4 kg (175 lb)      Ht Readings from Last 1 Encounters:   05/03/16 1.702 m (5' 7\")     GENERAL: pleasant young woman in no distress; I can see from lower trunk up,    SKIN: Visible skin clear. No significant rash, abnormal pigmentation or lesions.  EYES: glasses; Eyes grossly normal to inspection.   NECK: visible goiter is strongly suggested - symmetrical appearing   RESP: No audible wheeze, cough, or  increased work of breathing.    NEURO: Awake, alert, responds appropriately to questions.  Mentation and speech fluent.  PSYCH:affect normal but a little low energy/ tired appearing  and appearance well-groomed.    DATA REVIEW    US THYROID 5/3/2023 8:52 AM     COMPARISON: 3/11/2022, 2/21/2022     HISTORY: Thyroid nodule previously biopsied nodule 1.     FINDINGS:   Thyroid parenchyma: Heterogeneous  The right lobe of the thyroid measures: 6.8 x 3.2 x 3.6 cm  The left lobe of the thyroid measures: 4.6 x 2.0 x 2.3 cm  The thyroid isthmus measures: 0.6 cm      Nodule 1:  Lobe: Right  Location: Inferior  Size: 3.6 x 2.9 x 3.3 cm  Composition: Solid or almost completely solid (2 points)  Echogenicity: Hyperechoic or isoechoic (1 point)  Shape: Wider than tall (0 points)  Margin: Smooth (0 points)  Echogenic Foci: None or large comet tail artifact (0 points)  Stability: No significant change in " size  TIRADS: TR3 (3 points)   Previously biopsied consistent with a benign nodule.     Nodule 2:  Lobe: Left  Location: Inferior  Size: 1.5 x 1.2 x 1.3 cm  Composition: Solid or almost completely solid (2 points)  Echogenicity: Hyperechoic or isoechoic (1 point)  Shape: Wider than tall (0 points)  Margin: Smooth (0 points)  Echogenic Foci: None or large comet tail artifact (0 points)  Stability: Minimally Enlarging, previously 1.5 x 1.0 x 1.2 cm  TIRADS: TR3 (3 points)         Stable multiple hypoechoic, reniform nodules inferior to the left  thyroid gland measuring up to 0.9 x 0.7 x 0.7 cm.                                                                         Impression:  1.  Minimally increased left thyroid nodule (#2) characterized as  TIRADS-3. Recommend follow-up ultrasound in one year as per TIRADS  recommendations.  2.  Stable right thyroid nodule, previously biopsied and benign.  3.  Stable inferior left thyroid nodules favoring lymph nodes.        ACR TI-RADS recommendations  TR2 (2 points) & TR1 (0 points) -No FNA or follow-up  TR3 (3 points) - FNA if ? 2.5cm, follow-up if 1.5 -2.4 cm in 1, 3 and  5 years  TR4 (4-6 points) - FNA if ? 1.5cm, follow-up if 1 -1.4 cm in 1, 2, 3  and 5 years  TR5 (?7 points) - FNA if ? 1cm, follow-up if 0.5 -0.9 cm every year  for 5 years      NELLY WOODY MD             Virtual Visit Details    Type of service:  Video Visit

## 2023-05-19 ASSESSMENT — ENCOUNTER SYMPTOMS
FREQUENCY: 0
JOINT SWELLING: 0
CONSTIPATION: 0
DYSURIA: 0
ARTHRALGIAS: 0
DIZZINESS: 0
NERVOUS/ANXIOUS: 0
ABDOMINAL PAIN: 0
CHILLS: 0
HEARTBURN: 0
NAUSEA: 0
COUGH: 0
MYALGIAS: 0
PALPITATIONS: 0
FEVER: 0
DIARRHEA: 0
EYE PAIN: 0
SORE THROAT: 0
WEAKNESS: 0
BREAST MASS: 0
HEADACHES: 0
SHORTNESS OF BREATH: 0
HEMATOCHEZIA: 0
PARESTHESIAS: 0
HEMATURIA: 0

## 2023-05-26 ENCOUNTER — OFFICE VISIT (OUTPATIENT)
Dept: FAMILY MEDICINE | Facility: CLINIC | Age: 33
End: 2023-05-26
Payer: COMMERCIAL

## 2023-05-26 VITALS
TEMPERATURE: 97.8 F | HEIGHT: 67 IN | WEIGHT: 188.5 LBS | HEART RATE: 86 BPM | SYSTOLIC BLOOD PRESSURE: 126 MMHG | RESPIRATION RATE: 7 BRPM | BODY MASS INDEX: 29.58 KG/M2 | OXYGEN SATURATION: 100 % | DIASTOLIC BLOOD PRESSURE: 86 MMHG

## 2023-05-26 DIAGNOSIS — Z31.69 ENCOUNTER FOR PRECONCEPTION CONSULTATION: ICD-10-CM

## 2023-05-26 DIAGNOSIS — Z13.1 SCREENING FOR DIABETES MELLITUS: ICD-10-CM

## 2023-05-26 DIAGNOSIS — Z91.010 PEANUT ALLERGY: ICD-10-CM

## 2023-05-26 DIAGNOSIS — F33.0 MILD EPISODE OF RECURRENT MAJOR DEPRESSIVE DISORDER (H): ICD-10-CM

## 2023-05-26 DIAGNOSIS — Z11.4 SCREENING FOR HIV (HUMAN IMMUNODEFICIENCY VIRUS): ICD-10-CM

## 2023-05-26 DIAGNOSIS — Z00.00 VISIT FOR PREVENTIVE HEALTH EXAMINATION: Primary | ICD-10-CM

## 2023-05-26 DIAGNOSIS — Z11.59 NEED FOR HEPATITIS C SCREENING TEST: ICD-10-CM

## 2023-05-26 DIAGNOSIS — Z12.4 CERVICAL CANCER SCREENING: ICD-10-CM

## 2023-05-26 LAB — HBA1C MFR BLD: 4.9 % (ref 0–5.6)

## 2023-05-26 PROCEDURE — 36415 COLL VENOUS BLD VENIPUNCTURE: CPT | Performed by: FAMILY MEDICINE

## 2023-05-26 PROCEDURE — 99214 OFFICE O/P EST MOD 30 MIN: CPT | Mod: 25 | Performed by: FAMILY MEDICINE

## 2023-05-26 PROCEDURE — 87389 HIV-1 AG W/HIV-1&-2 AB AG IA: CPT | Performed by: FAMILY MEDICINE

## 2023-05-26 PROCEDURE — G0145 SCR C/V CYTO,THINLAYER,RESCR: HCPCS | Performed by: FAMILY MEDICINE

## 2023-05-26 PROCEDURE — 83036 HEMOGLOBIN GLYCOSYLATED A1C: CPT | Performed by: FAMILY MEDICINE

## 2023-05-26 PROCEDURE — 86803 HEPATITIS C AB TEST: CPT | Performed by: FAMILY MEDICINE

## 2023-05-26 PROCEDURE — 87624 HPV HI-RISK TYP POOLED RSLT: CPT | Performed by: FAMILY MEDICINE

## 2023-05-26 PROCEDURE — 86787 VARICELLA-ZOSTER ANTIBODY: CPT | Performed by: FAMILY MEDICINE

## 2023-05-26 PROCEDURE — 86762 RUBELLA ANTIBODY: CPT | Performed by: FAMILY MEDICINE

## 2023-05-26 PROCEDURE — 99385 PREV VISIT NEW AGE 18-39: CPT | Performed by: FAMILY MEDICINE

## 2023-05-26 RX ORDER — FLUOXETINE 10 MG/1
10 CAPSULE ORAL DAILY
Qty: 14 CAPSULE | Refills: 0 | Status: SHIPPED | OUTPATIENT
Start: 2023-05-26 | End: 2023-07-18

## 2023-05-26 ASSESSMENT — PATIENT HEALTH QUESTIONNAIRE - PHQ9
SUM OF ALL RESPONSES TO PHQ QUESTIONS 1-9: 14
10. IF YOU CHECKED OFF ANY PROBLEMS, HOW DIFFICULT HAVE THESE PROBLEMS MADE IT FOR YOU TO DO YOUR WORK, TAKE CARE OF THINGS AT HOME, OR GET ALONG WITH OTHER PEOPLE: SOMEWHAT DIFFICULT
SUM OF ALL RESPONSES TO PHQ QUESTIONS 1-9: 14

## 2023-05-26 ASSESSMENT — PAIN SCALES - GENERAL: PAINLEVEL: NO PAIN (0)

## 2023-05-26 NOTE — PROGRESS NOTES
"Assessment/Plan    Preventive.  See below orders for screening tests and immunizations.  -Declines gonorrhea and Chlamydia screening    Preconception counseling.  We will check rubella and varicella titers per patient request.    Depression.  Good insight.  -Restart fluoxetine.  Previous dose was 40 mg daily.  We will restart at 10 mg daily for 2 weeks, then 20 mg daily  -Restart behavioral therapy    F/u in 2 months for mood.    Orders Placed This Encounter   Procedures     REVIEW OF HEALTH MAINTENANCE PROTOCOL ORDERS     HIV Antigen Antibody Combo     Hepatitis C Screen Reflex to HCV RNA Quant and Genotype     Varicella Zoster Virus Antibody IgG     Rubella Antibody IgG     Hemoglobin A1c     [facility] Referral     ----  Chief complaint: Physical    Social History     Social History Narrative    Updated 5/30/2023: Grew up in [place]. Lives with . No kids. Has dog. 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 Name is pronounced like \"Ahh-nuh.\"     Contraception.  Patient did not tolerate NuvaRing due to changes in headache character.  She is currently using condoms for pregnancy prevention.  She is considering pregnancy within the next year.    Hashimoto's hypothyroidism.  Followed by endocrinology.  Also with thyroid nodule.    Depression.  This was diagnosed when patient was a student.  She responded well to fluoxetine, but tapered off of this about a year ago.  Recently, she has noted fatigue, low appetite and less interest in socializing with others.  She denies recent thoughts of self-harm.  She is interested in restarting fluoxetine.    Vegetarian.  Not using B12 supplement.    Exam  /86 (BP Location: Left arm, Patient Position: Sitting, Cuff Size: Adult Regular)   Pulse 86   Temp 97.8  F (36.6  C) (Temporal)   Resp (!) 7   Ht 1.7 m (5' 6.93\")   Wt 85.5 kg (188 lb 8 oz)   LMP 05/08/2023   SpO2 100%   BMI 29.59 kg/m    Patient's last menstrual period was " 05/08/2023.    Heart with regular rate and rhythm, no murmurs.  Lung fields clear to auscultation bilaterally.  Thyroid nodule on the right.    Normal external female genitalia.  White discharge along vaginal walls.  Cervix without lesions.  Antonette Green RN, present during gynecologic exam.

## 2023-05-27 ENCOUNTER — HEALTH MAINTENANCE LETTER (OUTPATIENT)
Age: 33
End: 2023-05-27

## 2023-05-27 LAB
HCV AB SERPL QL IA: NONREACTIVE
HIV 1+2 AB+HIV1 P24 AG SERPL QL IA: NONREACTIVE

## 2023-05-30 PROBLEM — F33.0 MILD EPISODE OF RECURRENT MAJOR DEPRESSIVE DISORDER (H): Status: ACTIVE | Noted: 2023-05-30

## 2023-05-30 LAB
RUBV IGG SERPL QL IA: 1.36 INDEX
RUBV IGG SERPL QL IA: POSITIVE
VZV IGG SER QL IA: 124.7 INDEX
VZV IGG SER QL IA: NORMAL

## 2023-05-30 RX ORDER — EPINEPHRINE 0.3 MG/.3ML
0.3 INJECTION SUBCUTANEOUS PRN
Qty: 2 EACH | Refills: 11 | Status: SHIPPED | OUTPATIENT
Start: 2023-05-30

## 2023-06-01 LAB
BKR LAB AP GYN ADEQUACY: NORMAL
BKR LAB AP GYN INTERPRETATION: NORMAL
BKR LAB AP HPV REFLEX: NORMAL
BKR LAB AP LMP: NORMAL
BKR LAB AP PREVIOUS ABNORMAL: NORMAL
PATH REPORT.COMMENTS IMP SPEC: NORMAL
PATH REPORT.COMMENTS IMP SPEC: NORMAL
PATH REPORT.RELEVANT HX SPEC: NORMAL

## 2023-06-05 LAB
HUMAN PAPILLOMA VIRUS 16 DNA: NEGATIVE
HUMAN PAPILLOMA VIRUS 18 DNA: NEGATIVE
HUMAN PAPILLOMA VIRUS FINAL DIAGNOSIS: NORMAL
HUMAN PAPILLOMA VIRUS OTHER HR: NEGATIVE

## 2023-06-11 ASSESSMENT — PATIENT HEALTH QUESTIONNAIRE - PHQ9
10. IF YOU CHECKED OFF ANY PROBLEMS, HOW DIFFICULT HAVE THESE PROBLEMS MADE IT FOR YOU TO DO YOUR WORK, TAKE CARE OF THINGS AT HOME, OR GET ALONG WITH OTHER PEOPLE: NOT DIFFICULT AT ALL
SUM OF ALL RESPONSES TO PHQ QUESTIONS 1-9: 12
SUM OF ALL RESPONSES TO PHQ QUESTIONS 1-9: 12

## 2023-06-12 ENCOUNTER — ALLIED HEALTH/NURSE VISIT (OUTPATIENT)
Dept: FAMILY MEDICINE | Facility: CLINIC | Age: 33
End: 2023-06-12
Payer: COMMERCIAL

## 2023-06-12 ENCOUNTER — OFFICE VISIT (OUTPATIENT)
Dept: BEHAVIORAL HEALTH | Facility: CLINIC | Age: 33
End: 2023-06-12
Payer: COMMERCIAL

## 2023-06-12 DIAGNOSIS — Z23 ENCOUNTER FOR IMMUNIZATION: Primary | ICD-10-CM

## 2023-06-12 DIAGNOSIS — F43.21 ADJUSTMENT DISORDER WITH DEPRESSED MOOD: Primary | ICD-10-CM

## 2023-06-12 PROCEDURE — 99207 PR NO CHARGE NURSE ONLY: CPT

## 2023-06-12 PROCEDURE — 90471 IMMUNIZATION ADMIN: CPT

## 2023-06-12 PROCEDURE — 90716 VAR VACCINE LIVE SUBQ: CPT

## 2023-06-12 PROCEDURE — 90791 PSYCH DIAGNOSTIC EVALUATION: CPT

## 2023-06-12 NOTE — PROGRESS NOTES
"      [facility] Primary Care: Integrated Behavioral Health      PATIENT'S NAME: Corine Gutierrez  PREFERRED NAME: Corine  PRONOUNS:       MRN: [id number]  : 1990  ADDRESS: [address] [facility] [address]  [facility]. NUMBER:  [id number]  DATE OF SERVICE: 23  START TIME: 12:30 PM  END TIME: 1:30 PM  PREFERRED PHONE: [phone]  May we leave a program related message: Yes  SERVICE MODALITY:  In-person    UNIVERSAL ADULT Mental Health DIAGNOSTIC ASSESSMENT    Identifying Information:  Patient is a 32 year old,  individual.  [facility]  Patient attended the session alone.    Chief Complaint:   The reason for seeking services at this time is: \"Depression symptoms, low interest/motivation\".  The problem(s) began in graduate school. First diagnosis, started prozac and participated in therapy in grad school. Pt reports that she graduated in May, and weaned self off medications after doing well and feeling better. 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 Pt reports that in 2023, her depressive symptoms reached a point where she wanted to re-start on medications and therapy. Pt states that at this point. She feels very fatigued and wants to spend most of her time in bed.     Patient has attempted to resolve these concerns in the past through medciation management and individual therapy. .    Social/Family History:  Patient reported they grew up in [place]  .  They were raised by biological parents  .  Parents were always together. Pt has a younger brother. Patient reported that their childhood was \"typical\".  Patient described their current relationships with family of origin as good.      The patient describes their cultural background as .  Cultural influences and impact on patient's life structure, values, norms, and healthcare: Grew up in " [place].  into [place] family.  Contextual influences on patient's health include: none reported. These factors will be addressed in the Preliminary Treatment plan.  Patient identified their preferred language to be English. Patient reported they do not need the assistance of an  or other support involved in therapy.     Patient reported had no significant delays in developmental tasks.   Patient's highest education level was graduate school  . Patient identified the following learning problems: none reported.  Modifications will not be used to assist communication in therapy.   Patient reports they are able to understand written materials.    Patient reported the following relationship history.  Patient's current relationship status is  since September 2023.   Patient identified their sexual orientation as bi-sexual.  Patient reported having  no child(avtar). Patient identified partner; parents; friends as part of their support system.  Patient identified the quality of these relationships as good,  solid, supportive.       Patient's current living/housing situation involves staying in own home/apartment. The immediate members of family and household include Rome Seth, Raphael, and they report that housing is stable.    Patient is currently employed part time. 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 Patient reports their finances are obtained through employment.   Patient does identify finances as a current stressor.      Patient reported that theyhave not been involved with the legal system. Patient does not being under probation/ parole/ jurisdiction.     Patient's Strengths and Limitations:  Patient identified the following strengths or resources that will help them succeed in treatment: commitment to health and well being, motivation and work ethic. Things that may interfere with the patient's success in treatment include: none identified.     Assessments:  The following  assessments were completed by patient for this visit:  PHQ9:       5/26/2023    11:16 AM 6/11/2023     6:15 PM   PHQ-9 SCORE   PHQ-9 Total Score MyChart 14 (Moderate depression) 12 (Moderate depression)   PHQ-9 Total Score 14 12     CAGE-AID:       6/11/2023     6:28 PM   CAGE-AID Total Score   Total Score 0   Total Score MyChart 0 (A total score of 2 or greater is considered clinically significant)     PROMIS 10-Global Health (only subscores and total score):       6/11/2023     6:28 PM   PROMIS-10 Scores Only   Global Mental Health Score 10   Global Physical Health Score 16   PROMIS TOTAL - SUBSCORES 26     [place] Suicide Severity Rating Scale (Lifetime/Recent)      6/14/2023     3:08 PM   [place] Suicide Severity Rating (Lifetime/Recent)   1. Wish to be Dead (Lifetime) N   2. Non-Specific Active Suicidal Thoughts (Lifetime) N   Actual Attempt (Lifetime) N   Has subject engaged in non-suicidal self-injurious behavior? (Lifetime) N   Interrupted Attempts (Lifetime) N   Aborted or Self-Interrupted Attempt (Lifetime) N   Preparatory Acts or Behavior (Lifetime) N   Calculated C-SSRS Risk Score (Lifetime/Recent) No Risk Indicated       Personal and Family Medical History:  Patient does report a family history of mental health concerns.  Patient reports family history includes Colon Cancer (age of onset: 70) in her maternal grandfather; Diabetes Type 1 in her paternal grandmother; Diabetes Type 2  in her father; Thyroid Disease in her maternal grandfather and paternal aunt. Reports that there is hx of alcoholism in her family. Brother is working through depression and anxiety. Bother reportedly also has had one episode of psychosis.     Patient does report Mental Health Diagnosis and/or Treatment.  Patient Patient reported the following previous diagnoses which include(s): Depression.  Patient reported symptoms began while in graduate school.   Patient has received mental health services in the past: was in therapy  for a year, but stopped a year and a half ago. Pt statte that it is a helpful way to reflect. Psychiatric Hospitalizations: None.  Patient denies a history of civil commitment.  Patient is not receiving other mental health services.          Patient has had a physical exam to rule out medical causes for current symptoms.  Date of last physical exam was within the past year. Client was encouraged to follow up with PCP if symptoms were to develop. The patient has a [facility] Primary Care Provider, who is named: Jatin Kraft MD. Patient reports no current medical concerns.  Patient denies any issues with pain..   There are not significant appetite / nutritional concerns / weight changes.   Patient does not report a history of head injury / trauma / cognitive impairment.      Current Medications:   FLUoxetine (PROZAC) 10 MG capsule Take 1 capsule (10 mg) by mouth daily   FLUoxetine (PROZAC) 20 MG capsule Take 1 capsule (20 mg) by mouth daily       Medication Adherence:  Patient reports taking medications as prescribed.       Patient Allergies:    Allergies   Allergen Reactions     Peanuts [Nuts] Nausea and Vomiting, Hives and Swelling     Abdominal pain  dizziness       Medical History:    Past Medical History:   Diagnosis Date     Premenstrual dysphoric disorder          Current Mental Status Exam:   Appearance:  Appropriate    Eye Contact:  Good   Psychomotor:  Normal       Gait / station:  no problem  Attitude / Demeanor: Cooperative  Interested Pleasant  Speech      Rate / Production: Normal/ Responsive      Volume:  Normal  volume      Language:  intact  Mood:   Sad   Affect:   Tearful   Thought Content: Clear   Thought Process: Coherent       Associations: No loosening of associations  Insight:   Good   Judgment:  Intact   Orientation:  All  Attention/concentration: Good      Substance Use:  Patient did report a family history of substance use concerns; see medical history section for details.  Patient has not  received chemical dependency treatment in the past.  Patient has not ever been to detox.      Patient is not currently receiving any chemical dependency treatment.           Substance History of use Age of first use Date of last use     Pattern and duration of use (include amounts and frequency)   Alcohol currently use   19 06/11/23 Drinks 3-4 times per week in moderation.    Cannabis   never used     REPORTS SUBSTANCE USE: N/A     Amphetamines   never used     REPORTS SUBSTANCE USE: N/A   Cocaine/crack    never used       REPORTS SUBSTANCE USE: N/A   Hallucinogens never used         REPORTS SUBSTANCE USE: N/A   Inhalants never used         REPORTS SUBSTANCE USE: N/A   Heroin never used         REPORTS SUBSTANCE USE: N/A   Other Opiates never used     REPORTS SUBSTANCE USE: N/A   Benzodiazepine   never used     REPORTS SUBSTANCE USE: N/A   Barbiturates never used     REPORTS SUBSTANCE USE: N/A   Over the counter meds never used     REPORTS SUBSTANCE USE: N/A   Caffeine currently use 13   Pt is not concerned with use.    Nicotine  never used     REPORTS SUBSTANCE USE: N/A   Other substances not listed above:  Identify:  never used     REPORTS SUBSTANCE USE: N/A     Patient reported the following problems as a result of their substance use: no problems, not applicable.    Substance Use: No symptoms    Based on the negative CAGE score and clinical interview there  are not indications of drug or alcohol abuse.      Significant Losses / Trauma / Abuse / Neglect Issues:   Patient did not  serve in the .  There are indications or report of significant loss, trauma, abuse or neglect issues related to: are no indications and client denies any losses, trauma, abuse, or neglect concerns.  Concerns for possible neglect are not present.     Safety Assessment:   Patient denies current homicidal ideation and behaviors.  Patient denies current self-injurious ideation and behaviors.    Patient denied risk behaviors  associated with substance use.  Patient denies any high risk behaviors associated with mental health symptoms.  Patient reports the following current concerns for their personal safety: None.  Patient reports there are not firearms in the house.        History of Safety Concerns:  Patient denied a history of homicidal ideation.     Patient denied a history of personal safety concerns.    Patient denied a history of assaultive behaviors.    Patient denied a history of sexual assault behaviors.     Patient denied a history of risk behaviors associated with substance use.  Patient denies any history of high risk behaviors associated with mental health symptoms.  Patient reports the following protective factors: forward or future oriented thinking; dedication to family or friends; safe and stable environment; effectively controls impulses; purpose; help seeking behaviors when distressed; adherence with prescribed medication; living with other people; daily obligations; effective problem solving skills; positive social skills; healthy fear of risky behaviors or pain; financial stability; sense of personal control or determination; access to a variety of clinical interventions and pets    Risk Plan:  See Recommendations for Safety and Risk Management Plan    Review of Symptoms per patient report:   Depression: Change in sleep, Lack of interest, Change in appetite, Irritability, Feeling sad, down, or depressed and Withdrawn  Sherry:  No Symptoms  Psychosis: No Symptoms  Anxiety: Excessive worry, Nervousness, Physical complaints, such as headaches, stomachaches, muscle tension, Social anxiety, Ruminations and Irritability  Panic:  No symptoms  Post Traumatic Stress Disorder:  No Symptoms   Eating Disorder: No Symptoms  ADD / ADHD:  No symptoms  Conduct Disorder: No symptoms  Autism Spectrum Disorder: No symptoms  Obsessive Compulsive Disorder: No Symptoms    Patient reports the following compulsive behaviors and treatment  history: n/a    Diagnostic Criteria:   Adjustment Disorder  A. The development of emotional or behavioral symptoms in response to an identifiable stressor(s) occurring within 3 months of the onset of the stressor(s)  B. These symptoms or behaviors are clinically significant, as evidenced by one or both of the following:       - Marked distress that is out of proportion to the severity/intensity of the stressor (with consideration for external context & culture)       - Significant impairment in social, occupational, or other important areas of functioning  C. The stress-related disturbance does not meet criteria for another disorder & is not not an exacerbation of another mental disorder  D. The symptoms do not represent normal bereavement  E. Once the stressor or its consequences have terminated, the symptoms do not persist for more than an additional 6 months       * Adjustment Disorder with Depressed Mood: The predominant manifestations are symptoms such as low mood, tearfulness, or feelings of hopelessness    Functional Status:  Patient reports the following functional impairments:  health maintenance, relationship(s), self-care and social interactions.     Nonprogrammatic care:  Patient is requesting basic services to address current mental health concerns.    Clinical Summary:  1. Reason for assessment: Pt is interested in re-starting individual therapy .  2. Psychosocial, Cultural and Contextual Factors: Pt's  is of [place] decent. Pt dicussed she and her  discussed having children soon, but have differing opinions on when to start trying.   3. Principal DSM5 Diagnoses  (Sustained by DSM5 Criteria Listed Above):   Adjustment Disorders  309.0 (F43.21) With depressed mood.  4. Other Diagnoses that is relevant to services:   n/a  5. Provisional Diagnosis:  n/a  6. Prognosis: Expect Improvement.  7. Likely consequences of symptoms if not treated: Further decompensation of mental health and overall  level of functioning.  8. Client strengths include:  caring, educated, employed, insightful, motivated and open to learning .     Recommendations:     1. Plan for Safety and Risk Management:   Safety and Risk: Recommended that patient call 911 or go to the local ED should there be a change in any of these risk factors..          Report to child / adult protection services was NA.     2. Patient did not identify any contextual factors that would interfere with pt's access to mental health care.     3. Initial Treatment will focus on:    Depressed Mood - reduction of depressive symptoms an overall mood lability. .     4. Resources/Service Plan:    services are not indicated.   Modifications to assist communication are not indicated.   Additional disability accommodations are not indicated.      5. Collaboration:   Collaboration / coordination of treatment will be initiated with the following  support professionals: consult with PCP as deemed appropriate.   6.  Referrals:   The following referral(s) will be initiated: Possibly consider referral to individual therapy.      A Release of Information has been obtained for the following:n/a      Emergency Contact was not obtained.      Clinical Substantiation/medical necessity for the above recommendations:  Pt would benefit from restarting individual therapy in an effort to help develop coping skills and improve overall mood stability. Pt would also continue to benefit from continued psychiatric medication management.     7. KIA:    KIA:  Discussed the general effects of drugs and alcohol on health and well-being.  Recommendations:  N/a.    8. Records:   These were reviewed at time of assessment.   Information in this assessment was obtained from the medical record and provided by patient who is a good historian.    Patient will have open access to their mental health medical record.    9.   Interactive Complexity: No      Provider Name/ Credentials:  Muriel  Raymond, [facility]  June 12, 2023

## 2023-06-12 NOTE — PROGRESS NOTES
Prior to immunization administration, verified patients identity using patient s name and date of birth. Please see Immunization Activity for additional information.     Screening Questionnaire for Adult Immunization    Are you sick today?   No   Do you have allergies to medications, food, a vaccine component or latex?   No   Have you ever had a serious reaction after receiving a vaccination?   No   Do you have a long-term health problem with heart, lung, kidney, or metabolic disease (e.g., diabetes), asthma, a blood disorder, no spleen, complement component deficiency, a cochlear implant, or a spinal fluid leak?  Are you on long-term aspirin therapy?   No   Do you have cancer, leukemia, HIV/AIDS, or any other immune system problem?   No   Do you have a parent, brother, or sister with an immune system problem?   No   In the past 3 months, have you taken medications that affect  your immune system, such as prednisone, other steroids, or anticancer drugs; drugs for the treatment of rheumatoid arthritis, Crohn s disease, or psoriasis; or have you had radiation treatments?   No   Have you had a seizure, or a brain or other nervous system problem?   No   During the past year, have you received a transfusion of blood or blood    products, or been given immune (gamma) globulin or antiviral drug?   No   For women: Are you pregnant or is there a chance you could become       pregnant during the next month?   No   Have you received any vaccinations in the past 4 weeks?   No     Immunization questionnaire answers were all negative.    I have reviewed the following standing orders:   This patient is due and qualifies for the Varicella vaccine.    Click here for Varicella (Adult) Standing Order    I have reviewed the vaccines inclusion and exclusion criteria; No concerns regarding eligibility.         Injection of Varicella per Dr Kraft's note for OV 5/26/2023 given by Malena Ramirez CMA. Patient instructed to remain in  clinic for 15 minutes afterwards, and to report any adverse reactions.     Screening performed by Malena Ramirez CMA on 6/12/2023 at 12:07 PM.

## 2023-06-14 ASSESSMENT — COLUMBIA-SUICIDE SEVERITY RATING SCALE - C-SSRS
TOTAL  NUMBER OF INTERRUPTED ATTEMPTS LIFETIME: NO
TOTAL  NUMBER OF ABORTED OR SELF INTERRUPTED ATTEMPTS LIFETIME: NO
6. HAVE YOU EVER DONE ANYTHING, STARTED TO DO ANYTHING, OR PREPARED TO DO ANYTHING TO END YOUR LIFE?: NO
2. HAVE YOU ACTUALLY HAD ANY THOUGHTS OF KILLING YOURSELF?: NO
1. HAVE YOU WISHED YOU WERE DEAD OR WISHED YOU COULD GO TO SLEEP AND NOT WAKE UP?: NO
ATTEMPT LIFETIME: NO

## 2023-07-05 ENCOUNTER — VIRTUAL VISIT (OUTPATIENT)
Dept: BEHAVIORAL HEALTH | Facility: CLINIC | Age: 33
End: 2023-07-05
Payer: COMMERCIAL

## 2023-07-05 DIAGNOSIS — F43.21 ADJUSTMENT DISORDER WITH DEPRESSED MOOD: Primary | ICD-10-CM

## 2023-07-05 PROCEDURE — 90834 PSYTX W PT 45 MINUTES: CPT | Mod: VID

## 2023-07-05 NOTE — PROGRESS NOTES
[facility] Primary Care: Integrated Behavioral Health  July 5, 2023      Behavioral Health Clinician Progress Note    Patient Name: Corine Gutierrez           Service Type:  Individual      Service Location:   MyChart / Email (patient reached)     Session Start Time: 12:54 PM  Session End Time: 1:40 PM      Session Length: 38 - 52      Attendees: Patient     Service Modality:  Video Visit:      Provider verified identity through the following two step process.  Patient provided:  Patient is known previously to provider    Telemedicine Visit: The patient's condition can be safely assessed and treated via synchronous audio and visual telemedicine encounter.      Reason for Telemedicine Visit: Patient has requested telehealth visit    Originating Site (Patient Location): Patient's home    Distant Site (Provider Location): [facility]    Consent:  The patient/guardian has verbally consented to: the potential risks and benefits of telemedicine (video visit) versus in person care; bill my insurance or make self-payment for services provided; and responsibility for payment of non-covered services.     Patient would like the video invitation sent by:  My Chart    Mode of Communication:  Video Conference via [facility]    Distant Location (Provider):  On-site    As the provider I attest to compliance with applicable laws and regulations related to telemedicine.    Visit Activities (Refresh list every visit): [facility] Only    Diagnostic Assessment Date: 6/12/23  Treatment Plan Review Date: TBD  See Flowsheets for today's PHQ-9 and KANCHAN-7 results  Previous PHQ-9:     5/26/2023    11:16 AM 6/11/2023     6:15 PM   PHQ-9 SCORE   PHQ-9 Total Score MyChart 14 (Moderate depression) 12 (Moderate depression)   PHQ-9 Total Score 14 12       DATA  Extended Session (60+ minutes): No  Interactive Complexity: No  Crisis: No  [facility] Patient: No    Treatment Objective(s) Addressed in This  Session:  Target Behavior(s): improve overall mood satsifaction.     Depressed Mood: Increase interest, engagement, and pleasure in doing things  Decrease frequency and intensity of feeling down, depressed, hopeless  Improve quantity and quality of night time sleep / decrease daytime naps  Feel less tired and more energy during the day   Improve diet, appetite, mindful eating, and / or meal planning  Identify negative self-talk and behaviors: challenge core beliefs, myths, and actions  Improve concentration, focus, and mindfulness in daily activities   Feel less fidgety, restless or slow in daily activities / interpersonal interactions    Current Stressors / Issues:    [facility] met with pt virtually on this date. Pt states that she has been sick the last several days and has been working overtime at work. Pt acknowledges that depression symptoms have persisted. Pt states that she does continue to spend a significant amount of time in bed, sleeping, and finding it difficult to motivate herself. Pt has made positive changes including reconnecting with friends and being social. [facility] and pt discussed behavioral activation on this date and how pt can start to develop a routine and start tasks during the day. Discussed routine: eat breakfast, read, exercise or do a project. One project or task per day. Self-care was also addressed: taking dog for walks, trying to shower and brush teeth. Started reading a book, tori. Pt denies safety concerns including SI/HI/SIB.     Progress on Treatment Objective(s) / Homework:  New Objective established this session - PREPARATION (Decided to change - considering how); Intervened by negotiating a change plan and determining options / strategies for behavior change, identifying triggers, exploring social supports, and working towards setting a date to begin behavior change    Motivational Interviewing    MI Intervention: Co-Developed Goal: Trying to start the morning off with coffee and  writing down one task for pt to complete. , Expressed Empathy/Understanding, Open-ended questions and Reflections: simple and complex     Change Talk Expressed by the Patient: Desire to change Ability to change Reasons to change    Provider Response to Change Talk: E - Evoked more info from patient about behavior change, A - Affirmed patient's thoughts, decisions, or attempts at behavior change, R - Reflected patient's change talk and S - Summarized patient's change talk statements    Also provided psychoeducation about behavioral health condition, symptoms, and treatment options    Care Plan review completed: Yes    Medication Review:  No changes to current psychiatric medication(s)    Medication Compliance:  Yes    Changes in Health Issues:   None reported    Chemical Use Review:   Substance Use: Chemical use reviewed, no active concerns identified      Tobacco Use: No current tobacco use.      Assessment: Current Emotional / Mental Status (status of significant symptoms):  Risk status (Self / Other harm or suicidal ideation)  Patient denies a history of suicidal ideation, suicide attempts, self-injurious behavior, homicidal ideation, homicidal behavior and and other safety concerns  Patient denies current fears or concerns for personal safety.  Patient denies current or recent suicidal ideation or behaviors.  Patient denies current or recent homicidal ideation or behaviors.  Patient denies current or recent self injurious behavior or ideation.  Patient denies other safety concerns.  A safety and risk management plan has not been developed at this time, however patient was encouraged to call [place] [phone] should there be a change in any of these risk factors.    Appearance:   Appropriate   Eye Contact:   Good   Psychomotor Behavior: Normal   Attitude:   Cooperative  Interested Pleasant  Orientation:   All  Speech   Rate / Production: Normal    Volume:  Normal   Mood:    Depressed   Affect:    Appropriate    Thought Content:  Clear   Thought Form:  Coherent  Logical   Insight:    Good     Diagnoses:  1. Adjustment disorder with depressed mood        Collateral Reports Completed:  Not Applicable    Plan: (Homework, other):  Patient was given information about behavioral services and encouraged to schedule a follow up appointment with the clinic [facility] in 2 weeks.  She was also given information about mental health symptoms and treatment options .  CD Recommendations: No indications of CD issues.     TOMI Byrd, [facility]  Behavioral Health Clinician  [facility], [address], [address], [place], [place], [address]  [phone]    2023

## 2023-07-11 ENCOUNTER — LAB (OUTPATIENT)
Dept: LAB | Facility: CLINIC | Age: 33
End: 2023-07-11
Payer: COMMERCIAL

## 2023-07-11 DIAGNOSIS — E03.8 SUBCLINICAL HYPOTHYROIDISM: ICD-10-CM

## 2023-07-11 LAB
T4 FREE SERPL-MCNC: 1.3 NG/DL (ref 0.9–1.7)
TSH SERPL DL<=0.005 MIU/L-ACNC: 0.53 UIU/ML (ref 0.3–4.2)

## 2023-07-11 PROCEDURE — 84443 ASSAY THYROID STIM HORMONE: CPT

## 2023-07-11 PROCEDURE — 84439 ASSAY OF FREE THYROXINE: CPT

## 2023-07-11 PROCEDURE — 36415 COLL VENOUS BLD VENIPUNCTURE: CPT

## 2023-07-17 ENCOUNTER — E-VISIT (OUTPATIENT)
Dept: FAMILY MEDICINE | Facility: CLINIC | Age: 33
End: 2023-07-17
Payer: COMMERCIAL

## 2023-07-17 DIAGNOSIS — F33.0 MILD EPISODE OF RECURRENT MAJOR DEPRESSIVE DISORDER (H): Primary | ICD-10-CM

## 2023-07-17 PROCEDURE — 99421 OL DIG E/M SVC 5-10 MIN: CPT | Performed by: FAMILY MEDICINE

## 2023-07-17 ASSESSMENT — PATIENT HEALTH QUESTIONNAIRE - PHQ9
SUM OF ALL RESPONSES TO PHQ QUESTIONS 1-9: 8
10. IF YOU CHECKED OFF ANY PROBLEMS, HOW DIFFICULT HAVE THESE PROBLEMS MADE IT FOR YOU TO DO YOUR WORK, TAKE CARE OF THINGS AT HOME, OR GET ALONG WITH OTHER PEOPLE: SOMEWHAT DIFFICULT
SUM OF ALL RESPONSES TO PHQ QUESTIONS 1-9: 8

## 2023-07-18 RX ORDER — FLUOXETINE 40 MG/1
40 CAPSULE ORAL DAILY
Qty: 90 CAPSULE | Refills: 1 | Status: SHIPPED | OUTPATIENT
Start: 2023-07-18 | End: 2024-02-09

## 2023-07-18 ASSESSMENT — PATIENT HEALTH QUESTIONNAIRE - PHQ9: SUM OF ALL RESPONSES TO PHQ QUESTIONS 1-9: 8

## 2023-07-19 ENCOUNTER — OFFICE VISIT (OUTPATIENT)
Dept: BEHAVIORAL HEALTH | Facility: CLINIC | Age: 33
End: 2023-07-19
Payer: COMMERCIAL

## 2023-07-19 DIAGNOSIS — F43.21 ADJUSTMENT DISORDER WITH DEPRESSED MOOD: Primary | ICD-10-CM

## 2023-07-19 PROCEDURE — 90832 PSYTX W PT 30 MINUTES: CPT

## 2023-07-19 ASSESSMENT — PATIENT HEALTH QUESTIONNAIRE - PHQ9
SUM OF ALL RESPONSES TO PHQ QUESTIONS 1-9: 9
10. IF YOU CHECKED OFF ANY PROBLEMS, HOW DIFFICULT HAVE THESE PROBLEMS MADE IT FOR YOU TO DO YOUR WORK, TAKE CARE OF THINGS AT HOME, OR GET ALONG WITH OTHER PEOPLE: SOMEWHAT DIFFICULT
SUM OF ALL RESPONSES TO PHQ QUESTIONS 1-9: 9

## 2023-07-19 NOTE — PROGRESS NOTES
[facility] Primary Care: Integrated Behavioral Health  July 18th, 2023      Behavioral Health Clinician Progress Note    Patient Name: Corine Gutierrez           Service Type:  Individual      Service Location:   MyChart / Email (patient reached)     Session Start Time: 11:30 AM  Session End Time: 11:55 AM      Session Length: 16 - 37      Attendees: Patient     Service Modality:  In-person    Visit Activities (Refresh list every visit): [facility] Only    Diagnostic Assessment Date: 6/12/23  Treatment Plan Review Date: TBD  See Flowsheets for today's PHQ-9 and KANCHAN-7 results  Previous PHQ-9:       6/11/2023     6:15 PM 7/17/2023     1:38 PM 7/19/2023    11:03 AM   PHQ-9 SCORE   PHQ-9 Total Score [facility] 12 (Moderate depression) 8 (Mild depression) 9 (Mild depression)   PHQ-9 Total Score 12 8 9       DATA  Extended Session (60+ minutes): No  Interactive Complexity: No  Crisis: No  [facility] Patient: No    Treatment Objective(s) Addressed in This Session:  Target Behavior(s): improve overall mood satsifaction.     Depressed Mood: Increase interest, engagement, and pleasure in doing things  Decrease frequency and intensity of feeling down, depressed, hopeless  Improve quantity and quality of night time sleep / decrease daytime naps  Feel less tired and more energy during the day   Improve diet, appetite, mindful eating, and / or meal planning  Identify negative self-talk and behaviors: challenge core beliefs, myths, and actions  Improve concentration, focus, and mindfulness in daily activities   Feel less fidgety, restless or slow in daily activities / interpersonal interactions    Current Stressors / Issues:    [facility] met with pt face to face in the clinic on this date. Pt states that she has taken action and started to limit game time and participate in activities such as cleaning. Pt reports that she continues to try and engage in behavioral activation. Pt is looking forward to [place] abroad,  however endorses some anxiety surrounding returning to work with increased workload. [facility] and pt discussed being intentional about caring for self and meeting her basic needs to the best of her ability upon her return to work, knowing that she will be working increased hours. Pt denies safety concerns including SI/HI/SIB. Pt does continue to endorse some low energy and fatigue. Pt reached out to PCP to increase Prozac dose.     Progress on Treatment Objective(s) / Homework:  Satisfactory progress - ACTION (Actively working towards change); Intervened by reinforcing change plan / affirming steps taken    Motivational Interviewing    MI Intervention: Co-Developed Goal: Trying to start the morning off with coffee and writing down one task for pt to complete. , Expressed Empathy/Understanding, Open-ended questions and Reflections: simple and complex     Change Talk Expressed by the Patient: Desire to change Ability to change Reasons to change    Provider Response to Change Talk: E - Evoked more info from patient about behavior change, A - Affirmed patient's thoughts, decisions, or attempts at behavior change, R - Reflected patient's change talk and S - Summarized patient's change talk statements    Also provided psychoeducation about behavioral health condition, symptoms, and treatment options    Care Plan review completed: Yes    Medication Review:  Changes to psychiatric medications, see updated Medication List in EPIC. Pt reports that she reached out to PCP to increase Prozac dose.     Medication Compliance:  Yes    Changes in Health Issues:   None reported    Chemical Use Review:   Substance Use: Chemical use reviewed, no active concerns identified      Tobacco Use: No current tobacco use.      Assessment: Current Emotional / Mental Status (status of significant symptoms):  Risk status (Self / Other harm or suicidal ideation)  Patient denies a history of suicidal ideation, suicide attempts, self-injurious behavior,  homicidal ideation, homicidal behavior and and other safety concerns  Patient denies current fears or concerns for personal safety.  Patient denies current or recent suicidal ideation or behaviors.  Patient denies current or recent homicidal ideation or behaviors.  Patient denies current or recent self injurious behavior or ideation.  Patient denies other safety concerns.  A safety and risk management plan has not been developed at this time, however patient was encouraged to call [facility] should there be a change in any of these risk factors.    Appearance:   Appropriate   Eye Contact:   Good   Psychomotor Behavior: Normal   Attitude:   Cooperative  Interested Pleasant  Orientation:   All  Speech   Rate / Production: Normal    Volume:  Normal   Mood:    Depressed   Affect:    Appropriate   Thought Content:  Clear   Thought Form:  Coherent  Logical   Insight:    Good     Diagnoses:  1. Adjustment disorder with depressed mood        Collateral Reports Completed:  Not Applicable    Plan: (Homework, other):  Patient was given information about behavioral services and encouraged to schedule a follow up appointment with the clinic [facility] in 1 month after returning from trip abroad. She was also given information about mental health symptoms and treatment options .  CD Recommendations: No indications of CD issues.     TOMI Byrd, [facility]  Behavioral Health Clinician  [facility], [address], [address], [address], [place], [place], [address]  [phone]    2023

## 2023-10-05 ENCOUNTER — LAB (OUTPATIENT)
Dept: LAB | Facility: CLINIC | Age: 33
End: 2023-10-05
Payer: COMMERCIAL

## 2023-10-05 DIAGNOSIS — N92.6 MISSED MENSES: Primary | ICD-10-CM

## 2023-10-05 DIAGNOSIS — N92.6 MISSED MENSES: ICD-10-CM

## 2023-10-05 LAB — HCG UR QL: NEGATIVE

## 2023-10-05 PROCEDURE — 81025 URINE PREGNANCY TEST: CPT

## 2023-12-10 ENCOUNTER — E-VISIT (OUTPATIENT)
Dept: FAMILY MEDICINE | Facility: CLINIC | Age: 33
End: 2023-12-10
Payer: COMMERCIAL

## 2023-12-10 DIAGNOSIS — R68.89 FLU-LIKE SYMPTOMS: Primary | ICD-10-CM

## 2023-12-10 PROCEDURE — 99421 OL DIG E/M SVC 5-10 MIN: CPT | Performed by: FAMILY MEDICINE

## 2023-12-10 RX ORDER — OSELTAMIVIR PHOSPHATE 75 MG/1
75 CAPSULE ORAL 2 TIMES DAILY
Qty: 10 CAPSULE | Refills: 0 | Status: SHIPPED | OUTPATIENT
Start: 2023-12-10 | End: 2023-12-15

## 2024-02-09 DIAGNOSIS — F33.0 MILD EPISODE OF RECURRENT MAJOR DEPRESSIVE DISORDER (H): ICD-10-CM

## 2024-02-09 RX ORDER — FLUOXETINE 40 MG/1
40 CAPSULE ORAL DAILY
Qty: 90 CAPSULE | Refills: 1 | Status: SHIPPED | OUTPATIENT
Start: 2024-02-09

## 2024-02-09 RX ORDER — FLUOXETINE 40 MG/1
40 CAPSULE ORAL DAILY
Qty: 90 CAPSULE | Refills: 1 | Status: CANCELLED | OUTPATIENT
Start: 2024-02-09

## 2024-05-01 ENCOUNTER — ANCILLARY PROCEDURE (OUTPATIENT)
Dept: ULTRASOUND IMAGING | Facility: CLINIC | Age: 34
End: 2024-05-01
Payer: COMMERCIAL

## 2024-05-01 ENCOUNTER — OFFICE VISIT (OUTPATIENT)
Dept: URGENT CARE | Facility: URGENT CARE | Age: 34
End: 2024-05-01
Payer: COMMERCIAL

## 2024-05-01 VITALS
RESPIRATION RATE: 16 BRPM | TEMPERATURE: 97.4 F | HEART RATE: 76 BPM | BODY MASS INDEX: 28.25 KG/M2 | OXYGEN SATURATION: 99 % | HEIGHT: 67 IN | WEIGHT: 180 LBS

## 2024-05-01 DIAGNOSIS — R07.0 THROAT PAIN: ICD-10-CM

## 2024-05-01 DIAGNOSIS — E03.8 SUBCLINICAL HYPOTHYROIDISM: ICD-10-CM

## 2024-05-01 DIAGNOSIS — J01.40 ACUTE NON-RECURRENT PANSINUSITIS: Primary | ICD-10-CM

## 2024-05-01 DIAGNOSIS — E04.1 THYROID NODULE: ICD-10-CM

## 2024-05-01 LAB — DEPRECATED S PYO AG THROAT QL EIA: POSITIVE

## 2024-05-01 PROCEDURE — 76536 US EXAM OF HEAD AND NECK: CPT | Performed by: RADIOLOGY

## 2024-05-01 PROCEDURE — 99213 OFFICE O/P EST LOW 20 MIN: CPT | Performed by: NURSE PRACTITIONER

## 2024-05-01 PROCEDURE — 87880 STREP A ASSAY W/OPTIC: CPT | Performed by: NURSE PRACTITIONER

## 2024-05-02 NOTE — PROGRESS NOTES
"Chief Complaint   Patient presents with    Urgent Care     Concern of sinus infection, sinus congestion. Started 19 days ago. Just flew back from [place].      SUBJECTIVE:  Corine Gutierrez is a 33 year old female presenting with sore throat sinus pain pressure headache postnasal drip mild cough worsening in the past 10 days.    Past Medical History:   Diagnosis Date    Premenstrual dysphoric disorder      Current Outpatient Medications   Medication Sig Dispense Refill    amoxicillin-clavulanate (AUGMENTIN) 875-125 MG tablet Take 1 tablet by mouth 2 times daily for 10 days 20 tablet 0    FLUoxetine (PROZAC) 40 MG capsule Take 1 capsule (40 mg) by mouth daily 90 capsule 1    levothyroxine (SYNTHROID/LEVOTHROID) 100 MCG tablet Take 1 tablet (100 mcg) by mouth daily 90 tablet 4    EPINEPHrine (ANY BX GENERIC EQUIV) 0.3 MG/0.3ML injection 2-pack Inject 0.3 mLs (0.3 mg) into the muscle as needed for anaphylaxis 2 each 11    ibuprofen (ADVIL,MOTRIN) 600 MG tablet Take 1 tablet (600 mg) by mouth every 8 hours as needed for moderate pain 20 tablet 0     No current facility-administered medications for this visit.     Social History     Tobacco Use    Smoking status: Never    Smokeless tobacco: Never   Substance Use Topics    Alcohol use: Yes     Alcohol/week: 7.0 standard drinks of alcohol     Types: 7 Standard drinks or equivalent per week     Allergies   Allergen Reactions    Peanuts [Nuts] Nausea and Vomiting, Hives and Swelling     Abdominal pain  dizziness       Review of Systems  All systems negative except for those listed above in HPI.    OBJECTIVE:   Pulse 76   Temp 97.4  F (36.3  C) (Temporal)   Resp 16   Ht 1.702 m (5' 7\")   Wt 81.6 kg (180 lb)   SpO2 99%   BMI 28.19 kg/m      Physical Exam  Vitals reviewed.   Constitutional:       Appearance: Normal appearance. She is ill-appearing.   HENT:      Head: Normocephalic and atraumatic.      Right Ear: Tympanic membrane and ear canal normal.      Left Ear: " "Tympanic membrane and ear canal normal.      Nose: Congestion and rhinorrhea present.      Mouth/Throat:      Mouth: Mucous membranes are moist.      Pharynx: Oropharyngeal exudate and posterior oropharyngeal erythema present.      Comments: Thick off-white postnasal drip  Cardiovascular:      Rate and Rhythm: Normal rate.      Pulses: Normal pulses.   Pulmonary:      Effort: Pulmonary effort is normal. No respiratory distress.      Breath sounds: No stridor. No wheezing, rhonchi or rales.   Chest:      Chest wall: No tenderness.   Musculoskeletal:         General: Normal range of motion.   Lymphadenopathy:      Cervical: Cervical adenopathy present.   Skin:     General: Skin is warm and dry.      Findings: No rash.   Neurological:      General: No focal deficit present.      Mental Status: She is alert and oriented to person, place, and time.   Psychiatric:         Mood and Affect: Mood normal.         Behavior: Behavior normal.       Results for orders placed or performed in visit on 05/01/24   Streptococcus A Rapid Screen w/Reflex to PCR - Clinic Collect     Status: Abnormal    Specimen: Throat; Swab   Result Value Ref Range    Group A Strep antigen Positive (A) Negative     ASSESSMENT:    ICD-10-CM    1. Acute non-recurrent pansinusitis  J01.40 amoxicillin-clavulanate (AUGMENTIN) 875-125 MG tablet     DISCONTINUED: amoxicillin-clavulanate (AUGMENTIN) 875-125 MG tablet      2. Throat pain  R07.0 Streptococcus A Rapid Screen w/Reflex to PCR - Clinic Collect        PLAN:     Antibiotics as directed for strep and sinusitis.  Drink plenty of fluids and rest.  May use salt water gargles- about 8 oz warm water with about 1 teaspoon salt  Sucrets and Cepacol spray are over the counter medications that numb the throat.  Over the counter pain relievers such as tylenol or ibuprofen may be used as needed.   Honey lemon tea helps to soothe the throat. \"Throat Coat\" tea is soothing as well.  Change toothbrush after 24 hours " of antibiotics (may soak in 3-6% hydrogen peroxide)  Will be contagious for 24 hours after starting antibiotic  May return to school//work/activities 24 hours after antibiotics are started.  Wash hands frequently and do not share beverages.  Please follow up with primary care provider if symptoms are not improving, worsening or new symptoms or for any adverse reactions to medications.     Follow up with primary care provider with any problems, questions or concerns or if symptoms worsen or fail to improve. Patient agreed to plan and verbalized understanding.    Lashanda Pugh, MARCO ANTONIO-BC  [facility]

## 2024-05-08 ENCOUNTER — LAB (OUTPATIENT)
Dept: LAB | Facility: CLINIC | Age: 34
End: 2024-05-08
Payer: COMMERCIAL

## 2024-05-08 DIAGNOSIS — E03.8 SUBCLINICAL HYPOTHYROIDISM: ICD-10-CM

## 2024-05-08 DIAGNOSIS — E04.1 THYROID NODULE: ICD-10-CM

## 2024-05-08 PROCEDURE — 84439 ASSAY OF FREE THYROXINE: CPT

## 2024-05-08 PROCEDURE — 84443 ASSAY THYROID STIM HORMONE: CPT

## 2024-05-08 PROCEDURE — 36415 COLL VENOUS BLD VENIPUNCTURE: CPT

## 2024-05-09 LAB
T4 FREE SERPL-MCNC: 1.33 NG/DL (ref 0.9–1.7)
TSH SERPL DL<=0.005 MIU/L-ACNC: 1.97 UIU/ML (ref 0.3–4.2)

## 2024-05-13 ENCOUNTER — VIRTUAL VISIT (OUTPATIENT)
Dept: ENDOCRINOLOGY | Facility: CLINIC | Age: 34
End: 2024-05-13
Payer: COMMERCIAL

## 2024-05-13 VITALS — HEIGHT: 67 IN | WEIGHT: 187 LBS | BODY MASS INDEX: 29.35 KG/M2

## 2024-05-13 DIAGNOSIS — E03.8 SUBCLINICAL HYPOTHYROIDISM: ICD-10-CM

## 2024-05-13 DIAGNOSIS — E04.1 THYROID NODULE: Primary | ICD-10-CM

## 2024-05-13 PROCEDURE — G2211 COMPLEX E/M VISIT ADD ON: HCPCS | Mod: 95

## 2024-05-13 PROCEDURE — 99214 OFFICE O/P EST MOD 30 MIN: CPT | Mod: 95

## 2024-05-13 RX ORDER — LEVOTHYROXINE SODIUM 100 UG/1
100 TABLET ORAL DAILY
Qty: 90 TABLET | Refills: 4 | Status: SHIPPED | OUTPATIENT
Start: 2024-05-13

## 2024-05-13 ASSESSMENT — PAIN SCALES - GENERAL: PAINLEVEL: NO PAIN (0)

## 2024-05-13 NOTE — PROGRESS NOTES
Endocrine  Video visit note-     Attending Assessment/Plan :     Subclinical hypothyroidism, autoimmune thyroid disease (AITD).    Treat to target TSh < 2.5 now, possibly to < 1.8 if planning pregnancy    LT4   100 mcg/day.   Yearly labs or sooner prn     Thyroid nodules: The right dominant nodule had benign cytology.  It looks like what we call white peterson in Hashimoto thyroiditis.  is new since 2009 and now increased 37% since the  US in 2022.  We repeat FNAB if nodule increases > 50%, we currently aren't there .  Next US timing will depend on if there is a pregnancy or not between now and next appt-- could be > 1 year     Family planning.  I have counseled her on the above in the context of possible future pregnancy.   Let me know immediately if diagnosis pregnancy.     Due to the continued risks of COVID 19 transmission and to improve ease of access this visit was a video visit. The patient gave verbal consent for the visit today.    I have independently reviewed and interpreted labs, imaging as indicated.     Distant Location (provider location):  Off-site  Mode of Communication:  Video Conference via Doctor [employer]  Chart review/prep time 1  [id number]  Visit Start time  1639   Visit Stop time  1644   20__ minutes spent on the date of the encounter doing chart review, history and exam, documentation and further activities as noted above.    Genesis Dasilva MD    Chief complaint:   HISTORY OF PRESENT ILLNESS  Corine returns for follow up of Subclinical hypothyroidism, autoimmune thyroid disease (AITD), thyroid ndoule.  I last saw her a year ago.  At that time she was on LT4 75 mcg/day and we increased to 100 mcg/day.. Follow up labs were normal July 2023 and also prior to this appt.  She also had thyroid US in anticipation of this appt .    Hypothyroid diagnosis was in  2009,  age 19.  She recalls that she had been at the 90th percentile for weight since puberty.  She was tired throughout high school.  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 she was found to have goiter     Endocrine relevant labs are as follows:  8/28/09 TSH 6.06, free T4 0.9  9/24/2009 TSh 6.44, free T4 0.9  10/28/2009 TSh 2.84   2/20/19 TSh 4.01  2/21/2022 TSh 3.26  2/22/2022  (< 9), MELODY 11 (< 1), ferritin 12  5/3/23 TSh 4.52, free T4 1.12 on LT4 75 mcg/day  7/11 23 TSH 0.53, free t4 1.3  5/8/24 TSH 1.97, free T4 1.33     Relevant imaging is as follows: (as read by me as it pertains to endocrine relevant organs)  5/1/24 thyroid US compared with 5/3/23 , 2/21/2022,  9/1/2009   Hypoechoic heterogeneous gland with white peterson nodules  Right nodule  1 iso/hyperechoic,  3.1 x 3.4  x 3.6 (19 cm3) cm was  2.9 x 3.3 x 3.6 cm (17.2 cm3) was  2.8 x 3.3 x 3 (13.9 cm3) inferior posterior, new since 2009 - FNAB 3/11/2022  Cytology benign   Left nodule # 2 hyperechoic  1.1 x 1 x 1.2 cm was  1.2 x 1.3 x 1.5 cm was 1 x 1.2 x 1.5 cm new since 2009  Left # 3 hyperechoic 0.5 x 0.3 x 0.7 cm   Inferior to left thyroid # 1 0.5 x 0.5 x 0.9 cm was 0.7 x 0.7 x 0.9 cm was 0.7 x 0.6 x 0.9 new since 2009    REVIEW OF SYSTEMS  Feeling OK  Energy eh  Depression   Need to exercise more   Schedule 24 hours at a time once/week  Clinic once/week and a lot days off  Sleep schedule: no routine   Thyroid bigger -- more obvious in pictures  She can feel it from inside  Voice is OK   Cardiac: negative  GI: go between loose and constipated often   Working on pregnancy  Menses monthly    Past Medical History  Past Medical History:   Diagnosis Date    Premenstrual dysphoric disorder      Past Surgical History:   Procedure Laterality Date    BIOPSY OF SKIN LESION      RESECT TUMOR LOWER EXTREMITY  04/10/2014    Procedure: RESECT TUMOR LOWER EXTREMITY;  Excision Left Fibula Tumor/exostosis;  Surgeon: Casimiro Rios MD;  Location: [facility]    WISDOM TOOTH EXTRACTION         Medications  Current Outpatient Medications   Medication Sig Dispense Refill    EPINEPHrine  "(ANY BX GENERIC EQUIV) 0.3 MG/0.3ML injection 2-pack Inject 0.3 mLs (0.3 mg) into the muscle as needed for anaphylaxis 2 each 11    FLUoxetine (PROZAC) 40 MG capsule Take 1 capsule (40 mg) by mouth daily 90 capsule 1    ibuprofen (ADVIL,MOTRIN) 600 MG tablet Take 1 tablet (600 mg) by mouth every 8 hours as needed for moderate pain 20 tablet 0    levothyroxine (SYNTHROID/LEVOTHROID) 100 MCG tablet Take 1 tablet (100 mcg) by mouth daily 90 tablet 4     etonogestrel implant removed 2/16/23    Allergies  Allergies   Allergen Reactions    Peanuts [Nuts] Nausea and Vomiting, Hives and Swelling     Abdominal pain  dizziness       Family History  Family History   Problem Relation Age of Onset    Diabetes Type 2  Father     Thyroid Disease Maternal Grandfather     Colon Cancer Maternal Grandfather 70    Diabetes Type 1 Paternal Grandmother     Thyroid Disease Paternal Aunt     Skin Cancer No family hx of     Breast Cancer No family hx of     Ovarian Cancer No family hx of      Social History  Social History     Tobacco Use    Smoking status: Never    Smokeless tobacco: Never   Vaping Use    Vaping status: Never Used   Substance Use Topics    Alcohol use: Yes     Alcohol/week: 7.0 standard drinks of alcohol     Types: 7 Standard drinks or equivalent per week    Drug use: No     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   BP Readings from Last 1 Encounters:   05/26/23 126/86      Pulse Readings from Last 1 Encounters:   05/01/24 76      Resp Readings from Last 1 Encounters:   05/01/24 16      Temp Readings from Last 1 Encounters:   05/01/24 97.4  F (36.3  C) (Temporal)      SpO2 Readings from Last 1 Encounters:   05/01/24 99%      Wt Readings from Last 1 Encounters:   05/01/24 81.6 kg (180 lb)      Ht Readings from Last 1 Encounters:   05/01/24 1.702 m (5' 7\")     GENERAL: pleasant young woman in no distress; I " can see from upper trunk up,    SKIN: Visible skin clear. No significant rash, abnormal pigmentation or lesions.  EYES: glasses; Eyes grossly normal to inspection.   NECK: visible goiter is strongly suggested - right slightly larger than left   RESP: No audible wheeze, cough, or  increased work of breathing.    NEURO: Awake, alert, responds appropriately to questions.  Mentation and speech fluent.  PSYCH:affect normal  and appearance well-groomed.    DATA REVIEW   Latest Ref Rng 5/3/2023  10:01 AM 7/11/2023  9:07 AM 5/8/2024  1:36 PM   ENDO THYROID LABS-[facility]       TSH 0.30 - 4.20 uIU/mL 4.52 (H)  0.53  1.97    TSH (External) 0.40 - 4.50 mIU/L      FREE T4 0.90 - 1.70 ng/dL 1.12  1.30  1.33       Legend:  (H) High      US THYROID 5/1/2024 11:10 AM  COMPARISON: 5/3/2023  HISTORY: Subclinical hypothyroidism; Thyroid nodule  FINDINGS:   Thyroid parenchyma: Heterogeneous  The right lobe of the thyroid measures: 6.7 x 3.8 x 3.5 cm  The left lobe of the thyroid measures: 4.4 x 1.7 x 1.9 cm  The thyroid isthmus measures: 0.5 cm  Nodule 1:  Location: Mid right thyroid lobe  Size: 3.1 x 3.4 x 3.6 cm  Composition: Solid or almost completely solid (2 points)  Echogenicity: Hyperechoic or isoechoic (1 point)  Shape: Taller than wide (3 points)  Margin: Smooth (0 points)  Echogenic Foci: None or large comet tail artifact (0 points)  Stability: No significant change in size  TIRADS: TR4 (4-6 points)      Nodule 2:  Location: Inferior left thyroid lobe  Size: 1.1 x 1.0 x 1.2 cm  Composition: Solid or almost completely solid (2 points)  Echogenicity: Hyperechoic or isoechoic (1 point)  Shape: Wider than tall (0 points)  Margin: Smooth (0 points)  Echogenic Foci: None or large comet tail artifact (0 points)  Stability: No significant change in size  TIRADS: TR3 (3 points)      Nodule 3:  Location: Mid left thyroid lobe  Size: 0.5 x 0.3 x 0.7 cm  Composition: Solid or almost completely solid (2 points)  Echogenicity: Hyperechoic or  isoechoic (1 point)  Shape: Wider than tall (0 points)  Margin: Smooth (0 points)  Echogenic Foci: None or large comet tail artifact (0 points)  Stability: Increased in conspicuity but not significantly changed in  size since prior  TIRADS: TR3 (3 points)      Nodule 4:  Location: Isthmus  Size: 0.5 x 0.4 x 0.6 cm  Composition: Solid or almost completely solid (2 points)  Echogenicity: Hyperechoic or isoechoic (1 point)  Shape: Wider than tall (0 points)  Margin: Smooth (0 points)  Echogenic Foci: Punctate echogenic foci (3 points)  Stability: Slightly increased in conspicuity but not significant  changed in size since prior  TIRADS: TR4 (4-6 points)      0.5 x 0.5 x 0.9 cm reniform lymph node inferior to the left thyroid  lobe, unchanged.                                                                      Impression:     Bilateral thyroid nodules, as detailed above, not significantly  changed since 5/3/2023, including the previously sampled TIRADS 4  right thyroid lobe nodule which was sampled with benign result.     ACR TI-RADS recommendations  TR2 (2 points) & TR1 (0 points) -No FNA or follow-up  TR3 (3 points) - FNA if ? 2.5cm, follow-up if 1.5 -2.4 cm in 1, 3 and  5 years  TR4 (4-6 points) - FNA if ? 1.5cm, follow-up if 1 -1.4 cm in 1, 2, 3  and 5 years  TR5 (?7 points) - FNA if ? 1cm, follow-up if 0.5 -0.9 cm every year  for 5 years      NORBERTO ROBLES, DO

## 2024-05-13 NOTE — LETTER
5/13/2024       RE: Corine Gutierrez  [address] [facility] [address]     Dear Colleague,    Thank you for referring your patient, Corine Gutierrez, to the [facility] [place] at [facility]. Please see a copy of my visit note below.    Endocrine  Video visit note-     Attending Assessment/Plan :     Subclinical hypothyroidism, autoimmune thyroid disease (AITD).    Treat to target TSh < 2.5 now, possibly to < 1.8 if planning pregnancy    LT4   100 mcg/day.   Yearly labs or sooner prn     Thyroid nodules: The right dominant nodule had benign cytology.  It looks like what we call white peterson in Hashimoto thyroiditis.  is new since 2009 and now increased 37% since the  US in 2022.  We repeat FNAB if nodule increases > 50%, we currently aren't there .  Next US timing will depend on if there is a pregnancy or not between now and next appt-- could be > 1 year     Family planning.  I have counseled her on the above in the context of possible future pregnancy.   Let me know immediately if diagnosis pregnancy.     Due to the continued risks of COVID 19 transmission and to improve ease of access this visit was a video visit. The patient gave verbal consent for the visit today.    I have independently reviewed and interpreted labs, imaging as indicated.     Distant Location (provider location):  Off-site  Mode of Communication:  Video Conference via [employer]  Chart review/prep time 1  [id number]  Visit Start time  1639   Visit Stop time  1644   20__ minutes spent on the date of the encounter doing chart review, history and exam, documentation and further activities as noted above.    Genesis Dasilva MD    Chief complaint:   HISTORY OF PRESENT ILLNESS  Corine returns for follow up of Subclinical hypothyroidism, autoimmune thyroid disease (AITD), thyroid ndoule.  I last saw her a year ago.  At that time she was on LT4 75 mcg/day and  we increased to 100 mcg/day.. Follow up labs were normal July 2023 and also prior to this appt.  She also had thyroid US in anticipation of this appt .    Hypothyroid diagnosis was in  2009,  age 19.  She recalls that she had been at the 90th percentile for weight since puberty.  She was tired throughout high school.  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 she was found to have goiter     Endocrine relevant labs are as follows:  8/28/09 TSH 6.06, free T4 0.9  9/24/2009 TSh 6.44, free T4 0.9  10/28/2009 TSh 2.84   2/20/19 TSh 4.01  2/21/2022 TSh 3.26  2/22/2022  (< 9), MELODY 11 (< 1), ferritin 12  5/3/23 TSh 4.52, free T4 1.12 on LT4 75 mcg/day  7/11 23 TSH 0.53, free t4 1.3  5/8/24 TSH 1.97, free T4 1.33     Relevant imaging is as follows: (as read by me as it pertains to endocrine relevant organs)  5/1/24 thyroid US compared with 5/3/23 , 2/21/2022,  9/1/2009   Hypoechoic heterogeneous gland with white peterson nodules  Right nodule  1 iso/hyperechoic,  3.1 x 3.4  x 3.6 (19 cm3) cm was  2.9 x 3.3 x 3.6 cm (17.2 cm3) was  2.8 x 3.3 x 3 (13.9 cm3) inferior posterior, new since 2009 - FNAB 3/11/2022  Cytology benign   Left nodule # 2 hyperechoic  1.1 x 1 x 1.2 cm was  1.2 x 1.3 x 1.5 cm was 1 x 1.2 x 1.5 cm new since 2009  Left # 3 hyperechoic 0.5 x 0.3 x 0.7 cm   Inferior to left thyroid # 1 0.5 x 0.5 x 0.9 cm was 0.7 x 0.7 x 0.9 cm was 0.7 x 0.6 x 0.9 new since 2009    REVIEW OF SYSTEMS  Feeling OK  Energy eh  Depression   Need to exercise more   Schedule 24 hours at a time once/week  Clinic once/week and a lot days off  Sleep schedule: no routine   Thyroid bigger -- more obvious in pictures  She can feel it from inside  Voice is OK   Cardiac: negative  GI: go between loose and constipated often   Working on pregnancy  Menses monthly    Past Medical History  Past Medical History:   Diagnosis Date    Premenstrual dysphoric disorder      Past Surgical History:   Procedure Laterality Date    BIOPSY  OF SKIN LESION      RESECT TUMOR LOWER EXTREMITY  04/10/2014    Procedure: RESECT TUMOR LOWER EXTREMITY;  Excision Left Fibula Tumor/exostosis;  Surgeon: Casimiro Rios MD;  Location: [facility]    WISDOM TOOTH EXTRACTION         Medications  Current Outpatient Medications   Medication Sig Dispense Refill    EPINEPHrine (ANY BX GENERIC EQUIV) 0.3 MG/0.3ML injection 2-pack Inject 0.3 mLs (0.3 mg) into the muscle as needed for anaphylaxis 2 each 11    FLUoxetine (PROZAC) 40 MG capsule Take 1 capsule (40 mg) by mouth daily 90 capsule 1    ibuprofen (ADVIL,MOTRIN) 600 MG tablet Take 1 tablet (600 mg) by mouth every 8 hours as needed for moderate pain 20 tablet 0    levothyroxine (SYNTHROID/LEVOTHROID) 100 MCG tablet Take 1 tablet (100 mcg) by mouth daily 90 tablet 4     etonogestrel implant removed 2/16/23    Allergies  Allergies   Allergen Reactions    Peanuts [Nuts] Nausea and Vomiting, Hives and Swelling     Abdominal pain  dizziness       Family History  Family History   Problem Relation Age of Onset    Diabetes Type 2  Father     Thyroid Disease Maternal Grandfather     Colon Cancer Maternal Grandfather 70    Diabetes Type 1 Paternal Grandmother     Thyroid Disease Paternal Aunt     Skin Cancer No family hx of     Breast Cancer No family hx of     Ovarian Cancer No family hx of      Social History  Social History     Tobacco Use    Smoking status: Never    Smokeless tobacco: Never   Vaping Use    Vaping status: Never Used   Substance Use Topics    Alcohol use: Yes     Alcohol/week: 7.0 standard drinks of alcohol     Types: 7 Standard drinks or equivalent per week    Drug use: No     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   BP Readings from Last 1 Encounters:   05/26/23 126/86      Pulse Readings from Last 1 Encounters:   05/01/24 76      Resp Readings from Last 1 Encounters:   05/01/24 16     "  Temp Readings from Last 1 Encounters:   05/01/24 97.4  F (36.3  C) (Temporal)      SpO2 Readings from Last 1 Encounters:   05/01/24 99%      Wt Readings from Last 1 Encounters:   05/01/24 81.6 kg (180 lb)      Ht Readings from Last 1 Encounters:   05/01/24 1.702 m (5' 7\")     GENERAL: pleasant young woman in no distress; I can see from upper trunk up,    SKIN: Visible skin clear. No significant rash, abnormal pigmentation or lesions.  EYES: glasses; Eyes grossly normal to inspection.   NECK: visible goiter is strongly suggested - right slightly larger than left   RESP: No audible wheeze, cough, or  increased work of breathing.    NEURO: Awake, alert, responds appropriately to questions.  Mentation and speech fluent.  PSYCH:affect normal  and appearance well-groomed.    DATA REVIEW   Latest Ref Rng 5/3/2023  10:01 AM 7/11/2023  9:07 AM 5/8/2024  1:36 PM   ENDO THYROID LABS-[facility]       TSH 0.30 - 4.20 uIU/mL 4.52 (H)  0.53  1.97    TSH (External) 0.40 - 4.50 mIU/L      FREE T4 0.90 - 1.70 ng/dL 1.12  1.30  1.33       Legend:  (H) High      US THYROID 5/1/2024 11:10 AM  COMPARISON: 5/3/2023  HISTORY: Subclinical hypothyroidism; Thyroid nodule  FINDINGS:   Thyroid parenchyma: Heterogeneous  The right lobe of the thyroid measures: 6.7 x 3.8 x 3.5 cm  The left lobe of the thyroid measures: 4.4 x 1.7 x 1.9 cm  The thyroid isthmus measures: 0.5 cm  Nodule 1:  Location: Mid right thyroid lobe  Size: 3.1 x 3.4 x 3.6 cm  Composition: Solid or almost completely solid (2 points)  Echogenicity: Hyperechoic or isoechoic (1 point)  Shape: Taller than wide (3 points)  Margin: Smooth (0 points)  Echogenic Foci: None or large comet tail artifact (0 points)  Stability: No significant change in size  TIRADS: TR4 (4-6 points)      Nodule 2:  Location: Inferior left thyroid lobe  Size: 1.1 x 1.0 x 1.2 cm  Composition: Solid or almost completely solid (2 points)  Echogenicity: Hyperechoic or isoechoic (1 point)  Shape: Wider than tall (0 " points)  Margin: Smooth (0 points)  Echogenic Foci: None or large comet tail artifact (0 points)  Stability: No significant change in size  TIRADS: TR3 (3 points)      Nodule 3:  Location: Mid left thyroid lobe  Size: 0.5 x 0.3 x 0.7 cm  Composition: Solid or almost completely solid (2 points)  Echogenicity: Hyperechoic or isoechoic (1 point)  Shape: Wider than tall (0 points)  Margin: Smooth (0 points)  Echogenic Foci: None or large comet tail artifact (0 points)  Stability: Increased in conspicuity but not significantly changed in  size since prior  TIRADS: TR3 (3 points)      Nodule 4:  Location: Isthmus  Size: 0.5 x 0.4 x 0.6 cm  Composition: Solid or almost completely solid (2 points)  Echogenicity: Hyperechoic or isoechoic (1 point)  Shape: Wider than tall (0 points)  Margin: Smooth (0 points)  Echogenic Foci: Punctate echogenic foci (3 points)  Stability: Slightly increased in conspicuity but not significant  changed in size since prior  TIRADS: TR4 (4-6 points)      0.5 x 0.5 x 0.9 cm reniform lymph node inferior to the left thyroid  lobe, unchanged.                                                                      Impression:     Bilateral thyroid nodules, as detailed above, not significantly  changed since 5/3/2023, including the previously sampled TIRADS 4  right thyroid lobe nodule which was sampled with benign result.     ACR TI-RADS recommendations  TR2 (2 points) & TR1 (0 points) -No FNA or follow-up  TR3 (3 points) - FNA if ? 2.5cm, follow-up if 1.5 -2.4 cm in 1, 3 and  5 years  TR4 (4-6 points) - FNA if ? 1.5cm, follow-up if 1 -1.4 cm in 1, 2, 3  and 5 years  TR5 (?7 points) - FNA if ? 1cm, follow-up if 0.5 -0.9 cm every year  for 5 years      NORBERTO ROBLES, DO

## 2024-05-13 NOTE — NURSING NOTE
Is the patient currently in the state of [place]? YES    Visit mode:VIDEO    If the visit is dropped, the patient can be reconnected by: VIDEO VISIT: Text to cell phone:   Telephone Information:   Mobile [phone]       Will anyone else be joining the visit? NO  (If patient encounters technical issues they should call [phone]    How would you like to obtain your AVS? MyChart    Are changes needed to the allergy or medication list? Pt stated no changes to allergies and Pt stated no med changes    Are refills needed on medications prescribed by this physician? YES     Reason for visit: RECHECK    Wt other than 24 hrs:    Pain more than one location:  no  Tyesha PINONF

## 2024-05-13 NOTE — PATIENT INSTRUCTIONS
Continue levothyroxine 100 mcg/day for now with yearly labs prior to appt     Normal thyroid levels are important for pregnancy.  You should let us know/ confirm normal thyroid levels immediately upon diagnosis of pregnancy.    Next US timing will depend on if there is a pregnancy or not between now and next appt

## 2024-05-30 ENCOUNTER — TELEPHONE (OUTPATIENT)
Dept: ENDOCRINOLOGY | Facility: CLINIC | Age: 34
End: 2024-05-30

## 2024-05-30 NOTE — TELEPHONE ENCOUNTER
[employer] (1st Attempt) and Patient Contacted for the patient to call back and schedule the following:    Appointment type: Return Endocrine  Provider: Dr. Dasilva  Return date: 1 year (around 5/13/25)  Specialty phone number: [phone]  Additional appointment(s) needed: labs 1 week prior appointment in 1 year  Additonal Notes: patient declined being available- requested a call back later. Sent myc x1

## 2024-06-03 NOTE — TELEPHONE ENCOUNTER
Patient confirmed scheduled appointment:  Date: 5/20/25  Time: 3:40 pm  Visit type: return endo  Provider: Dr. Dasilva  Location: virtual  Testing/imaging: NA  Additional notes: patient said they will complete labs on their own around 1 week prior to appointment

## 2024-08-03 ENCOUNTER — HEALTH MAINTENANCE LETTER (OUTPATIENT)
Age: 34
End: 2024-08-03

## 2024-10-07 ENCOUNTER — LAB (OUTPATIENT)
Dept: LAB | Facility: CLINIC | Age: 34
End: 2024-10-07
Payer: COMMERCIAL

## 2024-10-07 DIAGNOSIS — N97.9 FEMALE INFERTILITY: Primary | ICD-10-CM

## 2024-10-07 DIAGNOSIS — N97.9 FEMALE INFERTILITY: ICD-10-CM

## 2024-10-07 LAB — SHBG SERPL-SCNC: 53 NMOL/L (ref 30–135)

## 2024-10-07 PROCEDURE — 84270 ASSAY OF SEX HORMONE GLOBUL: CPT

## 2024-10-07 PROCEDURE — 99000 SPECIMEN HANDLING OFFICE-LAB: CPT

## 2024-10-07 PROCEDURE — 36415 COLL VENOUS BLD VENIPUNCTURE: CPT

## 2024-10-07 PROCEDURE — 82157 ASSAY OF ANDROSTENEDIONE: CPT | Mod: 90

## 2024-10-07 PROCEDURE — 82626 DEHYDROEPIANDROSTERONE: CPT | Mod: 90

## 2024-10-07 PROCEDURE — 84403 ASSAY OF TOTAL TESTOSTERONE: CPT

## 2024-10-09 LAB
TESTOST FREE SERPL-MCNC: 0.41 NG/DL
TESTOST SERPL-MCNC: 31 NG/DL (ref 8–60)

## 2024-10-10 LAB
ANDROST SERPL-MCNC: 1.46 NG/ML
DHEA SERPL-MCNC: 4.84 NG/ML

## 2024-11-04 ENCOUNTER — LAB (OUTPATIENT)
Dept: LAB | Facility: CLINIC | Age: 34
End: 2024-11-04
Payer: COMMERCIAL

## 2024-11-04 DIAGNOSIS — N97.9 FEMALE INFERTILITY: ICD-10-CM

## 2024-11-04 LAB
ESTRADIOL SERPL-MCNC: 66 PG/ML
FSH SERPL IRP2-ACNC: 4.8 MIU/ML
MIS SERPL-MCNC: 7.25 NG/ML (ref 0.58–8.1)
PROGEST SERPL-MCNC: 0.3 NG/ML
PROLACTIN SERPL 3RD IS-MCNC: 24 NG/ML (ref 5–23)

## 2024-11-04 PROCEDURE — 82166 ASSAY ANTI-MULLERIAN HORM: CPT

## 2024-11-04 PROCEDURE — 84146 ASSAY OF PROLACTIN: CPT

## 2024-11-04 PROCEDURE — 84144 ASSAY OF PROGESTERONE: CPT

## 2024-11-04 PROCEDURE — 83001 ASSAY OF GONADOTROPIN (FSH): CPT

## 2024-11-04 PROCEDURE — 36415 COLL VENOUS BLD VENIPUNCTURE: CPT

## 2024-11-04 PROCEDURE — 82670 ASSAY OF TOTAL ESTRADIOL: CPT

## 2024-11-21 ENCOUNTER — LAB (OUTPATIENT)
Dept: LAB | Facility: CLINIC | Age: 34
End: 2024-11-21
Payer: COMMERCIAL

## 2024-11-21 DIAGNOSIS — N97.9 FEMALE INFERTILITY: ICD-10-CM

## 2024-11-21 DIAGNOSIS — N97.9 FEMALE INFERTILITY: Primary | ICD-10-CM

## 2025-01-08 ENCOUNTER — OFFICE VISIT (OUTPATIENT)
Dept: OBGYN | Facility: CLINIC | Age: 35
End: 2025-01-08
Payer: COMMERCIAL

## 2025-01-08 VITALS
BODY MASS INDEX: 29.35 KG/M2 | SYSTOLIC BLOOD PRESSURE: 110 MMHG | WEIGHT: 187 LBS | HEIGHT: 67 IN | DIASTOLIC BLOOD PRESSURE: 68 MMHG

## 2025-01-08 DIAGNOSIS — Z31.9 PROCREATIVE MANAGEMENT: Primary | ICD-10-CM

## 2025-01-08 NOTE — PROGRESS NOTES
SUBJECTIVE:                                                   Corine Gutierrez is a 34 year old female who presents to clinic today for the following health issue(s):  Patient presents with:  Consult: Discuss PCOS      HPI:  Trying to conceive since 2023. Started with timed intercourse. Partner with semen analysis next week (). OPK: reports having positive results every day.  Partner has not had previous children. No pelvic US hx. Cycles 28-36/days, avg ~32. LMP 25. Noted hair on upper lip which she has been removing and has some concern regarding PCOS. Did have some lab work already done including DAY 3: FSH, estradiol, TSH, prolactin and DAY 21: progesterone.  She additionally had Testosterone and SHBG done. These values were all normal with the exception of day 21 progesterone being low. AMH was high normal.     Patient's last menstrual period was 2025..   Patient is sexually active, .  Using nothing for contraception.    reports that she has never smoked. She has never used smokeless tobacco.  STD testing offered?  Declined  Health maintenance reviewed.     Today's PHQ-2 Score:       2023    11:16 AM   PHQ-2 (  [employer])   Q1: Little interest or pleasure in doing things 2    Q2: Feeling down, depressed or hopeless 1    PHQ-2 Score 3   Q1: Little interest or pleasure in doing things More than half the days   Q2: Feeling down, depressed or hopeless Several days   PHQ-2 Score 3       Proxy-reported     Today's PHQ-9 Score:       2023    11:03 AM   PHQ-9 SCORE   PHQ-9 Total Score MyChart 9 (Mild depression)   PHQ-9 Total Score 9     Today's KANCHAN-7 Score:        No data to display                Problem list and histories reviewed & adjusted, as indicated.  Additional history: as documented.    Patient Active Problem List   Diagnosis    Dysplastic nevus of left lower extremity - moderate, removed by biopsy    Dysplastic nevus of right upper extremity - moderate, removed by  "biopsy    Hypothyroidism due to Hashimoto's thyroiditis    Peanut allergy    Thyroid nodule    Mild episode of recurrent major depressive disorder     Past Surgical History:   Procedure Laterality Date    BIOPSY OF SKIN LESION      RESECT TUMOR LOWER EXTREMITY  04/10/2014    Procedure: RESECT TUMOR LOWER EXTREMITY;  Excision Left Fibula Tumor/exostosis;  Surgeon: Casimiro Rios MD;  Location: [facility]    WISDOM TOOTH EXTRACTION        Social History     Tobacco Use    Smoking status: Never    Smokeless tobacco: Never   Substance Use Topics    Alcohol use: Yes     Alcohol/week: 7.0 standard drinks of alcohol     Types: 7 Standard drinks or equivalent per week      Problem (# of Occurrences) Relation (Name,Age of Onset)    Thyroid Disease (2) Maternal Grandfather, Paternal Aunt    Colon Cancer (1) Maternal Grandfather (70)    Diabetes Type 1 (1) Paternal Grandmother    Diabetes Type 2  (1) Father    No Known Problems (1) Mother           Negative family history of: Skin Cancer, Breast Cancer, Ovarian Cancer              Current Outpatient Medications   Medication Sig Dispense Refill    EPINEPHrine (ANY BX GENERIC EQUIV) 0.3 MG/0.3ML injection 2-pack Inject 0.3 mLs (0.3 mg) into the muscle as needed for anaphylaxis 2 each 11    FLUoxetine (PROZAC) 40 MG capsule Take 1 capsule (40 mg) by mouth daily 90 capsule 1    levothyroxine (SYNTHROID/LEVOTHROID) 100 MCG tablet Take 1 tablet (100 mcg) by mouth daily 90 tablet 4     No current facility-administered medications for this visit.     Allergies   Allergen Reactions    Peanuts [Nuts] Nausea and Vomiting, Hives and Swelling     Abdominal pain  dizziness       ROS:  12 point review of systems negative other than symptoms noted below or in the HPI.    OBJECTIVE:     /68   Ht 1.702 m (5' 7\")   Wt 84.8 kg (187 lb)   LMP 01/01/2025   BMI 29.29 kg/m    Body mass index is 29.29 kg/m .    Exam:  Constitutional:  Appearance: Well nourished, well developed alert, " in no acute distress  Psychiatric:  Mentation appears normal and affect normal/bright.     In-Clinic Test Results:  No results found for this or any previous visit (from the past 24 hours).    ASSESSMENT/PLAN:                                                        ICD-10-CM    1. Procreative management  Z31.9 XR Hysterosalpingogram        I had a detailed conversation with them regarding evaluation for infertility:  1.  Ovarian function:    Cycle day 3 labs:  FSH, estradiol, AMH, and prolactin: Normal  Cycle day 21:  Progesterone level: Low  2.  Other hormonal causes of irregular cycles or oligo ovulation:  TSH with reflex free T4 and prolactin: normal  3.  Uterine and tubal anatomy:  Hysterosalpingogram:  Reviewed how this procedure is performed, would schedule on cycle day 5 through 12.  Use to evaluate lining of the endometrium to make sure that it appears normal and to assess whether the fallopian tubes are blocked or open.  4.  Possible abnormal sperm:  Partner/spouse to give a semen sample for semen analysis.      Kimberlyn Segundo MD  [facility] [place]

## 2025-01-10 ENCOUNTER — HOSPITAL ENCOUNTER (OUTPATIENT)
Facility: CLINIC | Age: 35
Discharge: HOME | End: 2025-01-10
Admitting: RADIOLOGY
Payer: COMMERCIAL

## 2025-01-10 ENCOUNTER — HOSPITAL ENCOUNTER (OUTPATIENT)
Dept: GENERAL RADIOLOGY | Facility: CLINIC | Age: 35
Discharge: HOME | End: 2025-01-10
Attending: OBSTETRICS & GYNECOLOGY
Payer: COMMERCIAL

## 2025-01-10 DIAGNOSIS — Z31.9 PROCREATIVE MANAGEMENT: Primary | ICD-10-CM

## 2025-01-10 LAB — HCG UR QL: NEGATIVE

## 2025-01-10 PROCEDURE — 74740 X-RAY FEMALE GENITAL TRACT: CPT

## 2025-01-10 PROCEDURE — 81025 URINE PREGNANCY TEST: CPT | Performed by: OBSTETRICS & GYNECOLOGY

## 2025-01-10 PROCEDURE — 255N000002 HC RX 255 OP 636: Performed by: OBSTETRICS & GYNECOLOGY

## 2025-01-10 RX ORDER — IOPAMIDOL 612 MG/ML
50 INJECTION, SOLUTION INTRAVASCULAR ONCE
Status: COMPLETED | OUTPATIENT
Start: 2025-01-10 | End: 2025-01-10

## 2025-01-10 RX ADMIN — IOPAMIDOL 30 ML: 612 INJECTION, SOLUTION INTRAVENOUS at 08:33

## 2025-01-10 ASSESSMENT — ACTIVITIES OF DAILY LIVING (ADL)
ADLS_ACUITY_SCORE: 41

## 2025-01-13 ENCOUNTER — TRANSFERRED RECORDS (OUTPATIENT)
Dept: HEALTH INFORMATION MANAGEMENT | Facility: CLINIC | Age: 35
End: 2025-01-13
Payer: COMMERCIAL

## 2025-01-20 ENCOUNTER — OFFICE VISIT (OUTPATIENT)
Dept: OBGYN | Facility: CLINIC | Age: 35
End: 2025-01-20
Payer: COMMERCIAL

## 2025-01-20 VITALS — BODY MASS INDEX: 30.54 KG/M2 | DIASTOLIC BLOOD PRESSURE: 78 MMHG | WEIGHT: 195 LBS | SYSTOLIC BLOOD PRESSURE: 118 MMHG

## 2025-01-20 DIAGNOSIS — N97.0 OLIGO-OVULATION: Primary | ICD-10-CM

## 2025-01-20 PROCEDURE — 99213 OFFICE O/P EST LOW 20 MIN: CPT | Performed by: OBSTETRICS & GYNECOLOGY

## 2025-01-20 RX ORDER — LETROZOLE 2.5 MG/1
2.5 TABLET, FILM COATED ORAL DAILY
Qty: 5 TABLET | Refills: 0 | Status: SHIPPED | OUTPATIENT
Start: 2025-01-20 | End: 2025-01-25

## 2025-01-20 NOTE — PROGRESS NOTES
SUBJECTIVE:                                                   Corine Gutierrez is a 34 year old female who presents to clinic today for the following health issue(s):  Patient presents with:  Follow Up      Additional information: Follow up re fertility and HSG.     HPI:  Patient doing well.   Negative HCG on 1/10.   Partner's semen analysis came back largely unremarkable, per patient. She states the results should be available on the EMR shortly.   HSG also unremarkable.     Patient's last menstrual period was 2025..     Patient is sexually active, .  Using none for contraception.    reports that she has never smoked. She has never used smokeless tobacco.    STD testing offered?  Declined    Health maintenance updated:  yes    Today's PHQ-2 Score:       2023    11:16 AM   PHQ-2 (  [employer])   Q1: Little interest or pleasure in doing things 2    Q2: Feeling down, depressed or hopeless 1    PHQ-2 Score 3   Q1: Little interest or pleasure in doing things More than half the days   Q2: Feeling down, depressed or hopeless Several days   PHQ-2 Score 3       Proxy-reported     Today's PHQ-9 Score:       2023    11:03 AM   PHQ-9 SCORE   PHQ-9 Total Score MyChart 9 (Mild depression)   PHQ-9 Total Score 9     Today's KANCHAN-7 Score:        No data to display                Problem list and histories reviewed & adjusted, as indicated.  Additional history: as documented.    Patient Active Problem List   Diagnosis    Dysplastic nevus of left lower extremity - moderate, removed by biopsy    Dysplastic nevus of right upper extremity - moderate, removed by biopsy    Hypothyroidism due to Hashimoto's thyroiditis    Peanut allergy    Thyroid nodule    Mild episode of recurrent major depressive disorder     Past Surgical History:   Procedure Laterality Date    BIOPSY OF SKIN LESION      RESECT TUMOR LOWER EXTREMITY  04/10/2014    Procedure: RESECT TUMOR LOWER EXTREMITY;  Excision Left Fibula  Tumor/exostosis;  Surgeon: Casimiro Rios MD;  Location: [facility]    WISDOM TOOTH EXTRACTION        Social History     Tobacco Use    Smoking status: Never    Smokeless tobacco: Never   Substance Use Topics    Alcohol use: Yes     Alcohol/week: 7.0 standard drinks of alcohol     Types: 7 Standard drinks or equivalent per week      Problem (# of Occurrences) Relation (Name,Age of Onset)    Thyroid Disease (2) Maternal Grandfather, Paternal Aunt    Colon Cancer (1) Maternal Grandfather (70)    Diabetes Type 1 (1) Paternal Grandmother    Diabetes Type 2  (1) Father    No Known Problems (1) Mother           Negative family history of: Skin Cancer, Breast Cancer, Ovarian Cancer              Current Outpatient Medications   Medication Sig Dispense Refill    letrozole (FEMARA) 2.5 MG tablet Take 1 tablet (2.5 mg) by mouth daily for 5 days. Start of Day 3 of your menstrual cycle 5 tablet 0    EPINEPHrine (ANY BX GENERIC EQUIV) 0.3 MG/0.3ML injection 2-pack Inject 0.3 mLs (0.3 mg) into the muscle as needed for anaphylaxis 2 each 11    FLUoxetine (PROZAC) 40 MG capsule Take 1 capsule (40 mg) by mouth daily 90 capsule 1    levothyroxine (SYNTHROID/LEVOTHROID) 100 MCG tablet Take 1 tablet (100 mcg) by mouth daily 90 tablet 4     No current facility-administered medications for this visit.     Allergies   Allergen Reactions    Peanuts [Nuts] Nausea and Vomiting, Hives and Swelling     Abdominal pain  dizziness       ROS:  No symptoms other than noted above in the HPI.     OBJECTIVE:     /78   Wt 88.5 kg (195 lb)   LMP 01/01/2025   BMI 30.54 kg/m    Body mass index is 30.54 kg/m .    Exam:  Constitutional:  Appearance: Well nourished, well developed alert, in no acute distress  Psychiatric:  Mentation appears normal and affect normal/bright.     In-Clinic Test Results:  No results found for this or any previous visit (from the past 24 hours).    ASSESSMENT/PLAN:                                                         ICD-10-CM    1. Oligo-ovulation  N97.0 letrozole (FEMARA) 2.5 MG tablet          There are no Patient Instructions on file for this visit.    Oligo-ovulation  No obvious etiology for possible infertility at this time. Work-up including imaging (HSG) and routine infertility labs have been unrevealing. Will try letrozole at this time. Discussed the risks and benefits as well as future plan regarding dosage increase, if necessary. Patient was agreeable to this plan.     Kimberlyn Segundo MD  [facility] [place]

## 2025-02-04 ENCOUNTER — MYC MEDICAL ADVICE (OUTPATIENT)
Dept: OBGYN | Facility: CLINIC | Age: 35
End: 2025-02-04
Payer: COMMERCIAL

## 2025-02-04 DIAGNOSIS — Z31.9 PROCREATIVE MANAGEMENT: Primary | ICD-10-CM

## 2025-02-17 ENCOUNTER — LAB (OUTPATIENT)
Dept: LAB | Facility: CLINIC | Age: 35
End: 2025-02-17
Payer: COMMERCIAL

## 2025-02-17 DIAGNOSIS — Z31.9 PROCREATIVE MANAGEMENT: ICD-10-CM

## 2025-02-17 LAB — PROGEST SERPL-MCNC: 20.5 NG/ML

## 2025-02-17 PROCEDURE — 36415 COLL VENOUS BLD VENIPUNCTURE: CPT

## 2025-02-17 PROCEDURE — 84144 ASSAY OF PROGESTERONE: CPT

## 2025-03-18 ENCOUNTER — LAB (OUTPATIENT)
Dept: LAB | Facility: CLINIC | Age: 35
End: 2025-03-18
Payer: COMMERCIAL

## 2025-03-18 DIAGNOSIS — Z31.9 PROCREATIVE MANAGEMENT: ICD-10-CM

## 2025-03-18 DIAGNOSIS — N97.0 OLIGO-OVULATION: ICD-10-CM

## 2025-03-18 PROCEDURE — 36415 COLL VENOUS BLD VENIPUNCTURE: CPT

## 2025-03-18 PROCEDURE — 84144 ASSAY OF PROGESTERONE: CPT

## 2025-03-19 LAB — PROGEST SERPL-MCNC: 19.7 NG/ML

## 2025-04-22 NOTE — PROGRESS NOTES
04/22/25 12:03 PM  PATIENT LAB/IMAGING STATUS : MyChart sent to patient to complete standing/future orders

## 2025-04-26 DIAGNOSIS — Z91.010 PEANUT ALLERGY: ICD-10-CM

## 2025-04-26 RX ORDER — EPINEPHRINE 0.3 MG/.3ML
0.3 INJECTION SUBCUTANEOUS PRN
Qty: 2 EACH | Refills: 11 | Status: SHIPPED | OUTPATIENT
Start: 2025-04-26

## 2025-05-20 ENCOUNTER — VIRTUAL VISIT (OUTPATIENT)
Dept: ENDOCRINOLOGY | Facility: CLINIC | Age: 35
End: 2025-05-20
Payer: COMMERCIAL

## 2025-05-20 DIAGNOSIS — E03.8 SUBCLINICAL HYPOTHYROIDISM: ICD-10-CM

## 2025-05-20 DIAGNOSIS — Z33.1 PREGNANCY, INCIDENTAL: ICD-10-CM

## 2025-05-20 DIAGNOSIS — Z13.31 POSITIVE SCREENING FOR DEPRESSION ON 9-ITEM PATIENT HEALTH QUESTIONNAIRE (PHQ-9): ICD-10-CM

## 2025-05-20 DIAGNOSIS — E04.1 THYROID NODULE: Primary | ICD-10-CM

## 2025-05-20 RX ORDER — LEVOTHYROXINE SODIUM 100 UG/1
TABLET ORAL
Qty: 98 TABLET | Refills: 4 | Status: SHIPPED | OUTPATIENT
Start: 2025-05-20

## 2025-05-20 ASSESSMENT — PATIENT HEALTH QUESTIONNAIRE - PHQ9: SUM OF ALL RESPONSES TO PHQ QUESTIONS 1-9: 13

## 2025-05-20 ASSESSMENT — PAIN SCALES - GENERAL: PAINLEVEL_OUTOF10: NO PAIN (0)

## 2025-05-20 NOTE — PROGRESS NOTES
Endocrine  Video visit note-     Attending Assessment/Plan :     Subclinical hypothyroidism, autoimmune thyroid disease (AITD).    On LT4   100 mcg/day.   Unstable now due to pregnancy  Increase to 100 x 8/week- due to pregnancy.    Labs now , labs monthly     Thyroid nodules: The right dominant nodule had benign cytology.  It looks like what we call white peterson in Hashimoto thyroiditis.  is new since 2009 and now increased 37% since the  US in 2022.  Repeat US    Pregnancy test positive 2 days ago.   Monthly labs as indicated on AVS : TSH, total T4 . Treat to pregnancy specific targes     High PHQ 9 . Discussed. She already has support for this.       The Longitudinal plan of care for AITD/ subclinical hypothyroidism/ thyroid nodule was addressed during this visit. Due to added complexity of care, we will continue to support Corine , and the subsequent management of this condition(s) and with the ongoing continuity of care of this condition.    Due to the continued risks of COVID 19 transmission and to improve ease of access this visit was a video visit. .  The patient gave verbal consent for the visit today.    I have independently reviewed and interpreted labs, imaging as indicated.     Distant Location (provider location):  Off-site  Mode of Communication:  Video Conference via Marqui  Chart review/prep time 1  [id number]   Visit Start time  1538  Visit Stop time 1548   _33_ I spent minutes spent on the date of the encounter doing chart review, history and exam, documentation and further activities as noted above, exclusive of CGM reading time .      Genesis Dasilva MD    Chief complaint:   HISTORY OF PRESENT ILLNESS  Corine returns for follow up of Subclinical hypothyroidism, autoimmune thyroid disease (AITD), thyroid ndoule.  I last saw her 5/2024.  At that time she was on LT4  100 mcg/day and she continues on this dose ..    Subclinical Hypothyroid diagnosis was in  2009,  age 19. When she transitioned  from pediatric to adult medicine she was found to have goiter.  She also has high TPO and MELODY.      She started letrozole 1/20/25 for treatment of oligoovulation, prescribed by dr Kimberlyn Segundo, [facility].  Today she reports she had a positive pregnancy test 2 days ago.  She has not yet missed a period.      Endocrine relevant labs are as follows:  8/28/09 TSH 6.06, free T4 0.9  9/24/2009 TSh 6.44, free T4 0.9  10/28/2009 TSh 2.84   2/20/19 TSh 4.01  2/21/2022 TSh 3.26  2/22/2022  (< 9), MELODY 11 (< 1), ferritin 12  5/3/23 TSh 4.52, free T4 1.12 on LT4 75 mcg/day  7/11 23 TSH 0.53, free t4 1.3  5/8/24 TSH 1.97, free T4 1.33   1/10/25 hysterosalpingogram  4/25/25 TSh 2.25, free T4 1.31     Relevant imaging is as follows: (as read by me as it pertains to endocrine relevant organs)  5/1/24 thyroid US compared with 5/3/23 , 2/21/2022,  9/1/2009   Hypoechoic heterogeneous gland with white peterson nodules  Right nodule  1 iso/hyperechoic,  3.1 x 3.4  x 3.6 (19 cm3) cm was  2.9 x 3.3 x 3.6 cm (17.2 cm3) was  2.8 x 3.3 x 3 (13.9 cm3 in 2022 ) inferior posterior, new since 2009 - FNAB 3/11/2022  Cytology benign   Left nodule # 2 hyperechoic  1.1 x 1 x 1.2 cm was  1.2 x 1.3 x 1.5 cm was 1 x 1.2 x 1.5 cm new since 2009  Left # 3 hyperechoic 0.5 x 0.3 x 0.7 cm   Inferior to left thyroid # 1 0.5 x 0.5 x 0.9 cm was 0.7 x 0.7 x 0.9 cm was 0.7 x 0.6 x 0.9 new since 2009    REVIEW OF SYSTEMS  Feels normal    Energy OK  Sleep OK   Notices goiter more in the mirror   Bad PMDD -- rough 18 months of infertility without OCP.    Involved with IVF clinic   Already seeing provider for PMDD     Past Medical History  Past Medical History:   Diagnosis Date    Chronic lymphocytic thyroiditis     Premenstrual dysphoric disorder     Subclinical hypothyroidism     Thyroid nodule      Past Surgical History:   Procedure Laterality Date    BIOPSY OF SKIN LESION      RESECT TUMOR LOWER EXTREMITY  04/10/2014    Procedure: RESECT TUMOR  LOWER EXTREMITY;  Excision Left Fibula Tumor/exostosis;  Surgeon: Casimiro Rios MD;  Location: [facility] OR    WISDOM TOOTH EXTRACTION       Medications  Current Outpatient Medications   Medication Sig Dispense Refill    EPINEPHrine (ANY BX GENERIC EQUIV) 0.3 MG/0.3ML injection 2-pack Inject 0.3 mLs (0.3 mg) into the muscle as needed for anaphylaxis. 2 each 11    letrozole (FEMARA) 2.5 MG tablet Take 1 tablet (2.5 mg) by mouth daily. Start of Day 3 of your menstrual cycle 5 tablet 0    levothyroxine (SYNTHROID/LEVOTHROID) 100 MCG tablet MON to FRI  1 tablet/day; SAT and SUN 1.5 tablet 98 tablet 4       Allergies  Allergies   Allergen Reactions    Peanuts [Nuts] Nausea and Vomiting, Hives and Swelling     Abdominal pain  dizziness     Family History  Family History   Problem Relation Age of Onset    No Known Problems Mother     Diabetes Type 2  Father     Thyroid Disease Maternal Grandfather     Colon Cancer Maternal Grandfather 70    Diabetes Type 1 Paternal Grandmother     Thyroid Disease Paternal Aunt     Skin Cancer No family hx of     Breast Cancer No family hx of     Ovarian Cancer No family hx of      Social History  Social History     Tobacco Use    Smoking status: Never    Smokeless tobacco: Never   Vaping Use    Vaping status: Never Used   Substance Use Topics    Alcohol use: Yes     Alcohol/week: 7.0 standard drinks of alcohol     Types: 7 Standard drinks or equivalent per week    Drug use: No     Physical Exam  There is no height or weight on file to calculate BMI.   BP Readings from Last 1 Encounters:   01/20/25 118/78      Pulse Readings from Last 1 Encounters:   05/01/24 76      Resp Readings from Last 1 Encounters:   05/01/24 16      Temp Readings from Last 1 Encounters:   05/01/24 97.4  F (36.3  C) (Temporal)      SpO2 Readings from Last 1 Encounters:   05/01/24 99%      Wt Readings from Last 1 Encounters:   01/20/25 88.5 kg (195 lb)      Ht Readings from Last 1 Encounters:   01/08/25 1.702 m  "(5' 7\")     GENERAL: pleasant young woman in no distress; I can see from upper trunk up,    SKIN: Visible skin clear. No significant rash, abnormal pigmentation or lesions.  EYES: glasses; Eyes grossly normal to inspection.   NECK: visible goiter is suggested - right slightly larger than left   RESP: No audible wheeze, cough, or  increased work of breathing.    NEURO: Awake, alert, responds appropriately to questions.  Mentation and speech fluent.  PSYCH:affect normal  and appearance well-groomed.    DATA REVIEW   Latest Ref Rng 5/3/2023  10:01 AM 7/11/2023  9:07 AM 5/8/2024  1:36 PM 10/7/2024  11:32 AM 4/25/2025  4:16 PM   ENDO THYROID LABS-[facility]         TSH 0.30 - 4.20 uIU/mL 4.52 (H)  0.53  1.97   2.25    TSH (External) 0.40 - 4.50 mIU/L        FREE T4 0.90 - 1.70 ng/dL 1.12  1.30  1.33   1.31    Sex Hormone Binding Globulin 30 - 135 nmol/L    53        Legend:  (H) High      US THYROID 5/1/2024 11:10 AM  COMPARISON: 5/3/2023  HISTORY: Subclinical hypothyroidism; Thyroid nodule  FINDINGS:   Thyroid parenchyma: Heterogeneous  The right lobe of the thyroid measures: 6.7 x 3.8 x 3.5 cm  The left lobe of the thyroid measures: 4.4 x 1.7 x 1.9 cm  The thyroid isthmus measures: 0.5 cm  Nodule 1:  Location: Mid right thyroid lobe  Size: 3.1 x 3.4 x 3.6 cm  Composition: Solid or almost completely solid (2 points)  Echogenicity: Hyperechoic or isoechoic (1 point)  Shape: Taller than wide (3 points)  Margin: Smooth (0 points)  Echogenic Foci: None or large comet tail artifact (0 points)  Stability: No significant change in size  TIRADS: TR4 (4-6 points)      Nodule 2:  Location: Inferior left thyroid lobe  Size: 1.1 x 1.0 x 1.2 cm  Composition: Solid or almost completely solid (2 points)  Echogenicity: Hyperechoic or isoechoic (1 point)  Shape: Wider than tall (0 points)  Margin: Smooth (0 points)  Echogenic Foci: None or large comet tail artifact (0 points)  Stability: No significant change in size  TIRADS: TR3 (3 points)    "   Nodule 3:  Location: Mid left thyroid lobe  Size: 0.5 x 0.3 x 0.7 cm  Composition: Solid or almost completely solid (2 points)  Echogenicity: Hyperechoic or isoechoic (1 point)  Shape: Wider than tall (0 points)  Margin: Smooth (0 points)  Echogenic Foci: None or large comet tail artifact (0 points)  Stability: Increased in conspicuity but not significantly changed in  size since prior  TIRADS: TR3 (3 points)      Nodule 4:  Location: Isthmus  Size: 0.5 x 0.4 x 0.6 cm  Composition: Solid or almost completely solid (2 points)  Echogenicity: Hyperechoic or isoechoic (1 point)  Shape: Wider than tall (0 points)  Margin: Smooth (0 points)  Echogenic Foci: Punctate echogenic foci (3 points)  Stability: Slightly increased in conspicuity but not significant  changed in size since prior  TIRADS: TR4 (4-6 points)      0.5 x 0.5 x 0.9 cm reniform lymph node inferior to the left thyroid  lobe, unchanged.                                                                      Impression:     Bilateral thyroid nodules, as detailed above, not significantly  changed since 5/3/2023, including the previously sampled TIRADS 4  right thyroid lobe nodule which was sampled with benign result.     ACR TI-RADS recommendations  TR2 (2 points) & TR1 (0 points) -No FNA or follow-up  TR3 (3 points) - FNA if ? 2.5cm, follow-up if 1.5 -2.4 cm in 1, 3 and  5 years  TR4 (4-6 points) - FNA if ? 1.5cm, follow-up if 1 -1.4 cm in 1, 2, 3  and 5 years  TR5 (?7 points) - FNA if ? 1cm, follow-up if 0.5 -0.9 cm every year  for 5 years      ONRBERTO ROBLES, DO

## 2025-05-20 NOTE — PATIENT INSTRUCTIONS
I recommend you  increase the levothyroxine by 1 tablet per week, using the x mcg tablets as follows:  MON to FRI 1 tablet/day;  SAT and SUN 1.5 tablet/day    Congratulations on your pregnancy!    Normal thyroid levels are important for pregnancy.      You should confirm normal thyroid levels immediately upon diagnosis of pregnancy.      Levothyroxine dose requirements often increase with pregnancy.      It is routine to check thyroid levels once/month for the first 20 weeks gestation, then between 26 and 32 weeks, and also 2 months post partum.  We also check labs one month after any change in the levothyroxine dose.     I am leaving standing orders for follow up labs on the medical record for the above noted testing times:  Immediately on diagnosis of pregnancy  Every 4 weeks for the first 20 weeks or pregnancy,  Between weeks 26 and 32  2 months post partum.    Please call [phone]  to schedule lab follow up testing as directed. It is important the labs are drawn in the [employer] system.  This is the only way I will promptly get results.  If you choose to have labs drawn elsewhere it will be your responsibility to be sure I get the results promptly each month as outside labs often do not get me results .    If the dose of levothyroxine is increased with the pregnancy, it can be dropped back to the pre-pregnancy dose immediately after delivery.  Your pre-pregnancy dose is: 100  Mcg/day      Repeat thyroid US   If volume is > 50% we will repeat the biopsy

## 2025-05-20 NOTE — NURSING NOTE
Current patient location: Patient declined to provide     Is the patient currently in the state of [place]? YES    Visit mode: VIDEO    If the visit is dropped, the patient can be reconnected by:VIDEO VISIT: Send to e-mail at: [email or web address]    Will anyone else be joining the visit? NO  (If patient encounters technical issues they should call [phone]    Are changes needed to the allergy or medication list? No    Are refills needed on medications prescribed by this physician? NO    Rooming Documentation:  Questionnaire(s) not done per department protocol    Reason for visit: RECHECK    Depression Response    Patient completed the PHQ-9 assessment for depression and scored >9? Yes  Question 9 on the PHQ-9 was positive for suicidality? No  Does patient have current mental health provider? Yes    Is this a virtual visit? Yes   Does patient have suicidal ideation (positive question 9)? No - offer to place Mental Health Referral.  Patient declined referral/not needed    I personally notified the following: visit provider    Shelby Kocher VVF

## 2025-05-20 NOTE — LETTER
5/20/2025       RE: Corine Gutierrez  [address] [facility] [address]     Dear Colleague,    Thank you for referring your patient, Corine Gutierrez, to the [facility] ENDOCRINOLOG[facility] [place] at [facility]. Please see a copy of my visit note below.    04/22/25 12:03 PM  PATIENT LAB/IMAGING STATUS : MyChart sent to patient to complete standing/future orders       Endocrine  Video visit note-     Attending Assessment/Plan :     Subclinical hypothyroidism, autoimmune thyroid disease (AITD).    On LT4   100 mcg/day.   Unstable now due to pregnancy  Increase to 100 x 8/week- due to pregnancy.    Labs now , labs monthly     Thyroid nodules: The right dominant nodule had benign cytology.  It looks like what we call white peterson in Hashimoto thyroiditis.  is new since 2009 and now increased 37% since the  US in 2022.  Repeat US    Pregnancy test positive 2 days ago.   Monthly labs as indicated on AVS : TSH, total T4 . Treat to pregnancy specific targes     High PHQ 9 . Discussed. She already has support for this.       The Longitudinal plan of care for AITD/ subclinical hypothyroidism/ thyroid nodule was addressed during this visit. Due to added complexity of care, we will continue to support Corine , and the subsequent management of this condition(s) and with the ongoing continuity of care of this condition.    Due to the continued risks of COVID 19 transmission and to improve ease of access this visit was a video visit. .  The patient gave verbal consent for the visit today.    I have independently reviewed and interpreted labs, imaging as indicated.     Distant Location (provider location):  Off-site  Mode of Communication:  Video Conference via [employer]  Chart review/prep time 1  [id number]   Visit Start time  1538  Visit Stop time 1548   _33_ I spent minutes spent on the date of the encounter doing chart review, history and exam,  documentation and further activities as noted above, exclusive of CGM reading time .      Genesis Dasilva MD    Chief complaint:   HISTORY OF PRESENT ILLNESS  Corine returns for follow up of Subclinical hypothyroidism, autoimmune thyroid disease (AITD), thyroid ndoule.  I last saw her 5/2024.  At that time she was on LT4  100 mcg/day and she continues on this dose ..    Subclinical Hypothyroid diagnosis was in  2009,  age 19. 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 she was found to have goiter.  She also has high TPO and MELODY.      She started letrozole 1/20/25 for treatment of oligoovulation, [facility]  Today she reports she had a positive pregnancy test 2 days ago.  She has not yet missed a period.      Endocrine relevant labs are as follows:  8/28/09 TSH 6.06, free T4 0.9  9/24/2009 TSh 6.44, free T4 0.9  10/28/2009 TSh 2.84   2/20/19 TSh 4.01  2/21/2022 TSh 3.26  2/22/2022  (< 9), MELODY 11 (< 1), ferritin 12  5/3/23 TSh 4.52, free T4 1.12 on LT4 75 mcg/day  7/11 23 TSH 0.53, free t4 1.3  5/8/24 TSH 1.97, free T4 1.33   1/10/25 hysterosalpingogram  4/25/25 TSh 2.25, free T4 1.31     Relevant imaging is as follows: (as read by me as it pertains to endocrine relevant organs)  5/1/24 thyroid US compared with 5/3/23 , 2/21/2022,  9/1/2009   Hypoechoic heterogeneous gland with white peterson nodules  Right nodule  1 iso/hyperechoic,  3.1 x 3.4  x 3.6 (19 cm3) cm was  2.9 x 3.3 x 3.6 cm (17.2 cm3) was  2.8 x 3.3 x 3 (13.9 cm3 in 2022 ) inferior posterior, new since 2009 - FNAB 3/11/2022  Cytology benign   Left nodule # 2 hyperechoic  1.1 x 1 x 1.2 cm was  1.2 x 1.3 x 1.5 cm was 1 x 1.2 x 1.5 cm new since 2009  Left # 3 hyperechoic 0.5 x 0.3 x 0.7 cm   Inferior to left thyroid # 1 0.5 x 0.5 x 0.9 cm was 0.7 x 0.7 x 0.9 cm was 0.7 x 0.6 x 0.9 new since 2009    REVIEW OF SYSTEMS  Feels normal    Energy OK  Sleep OK   Notices goiter more in the mirror   Bad  PMDD -- rough 18 months of infertility without OCP.    Involved with IVF clinic   Already seeing provider for PMDD     Past Medical History  Past Medical History:   Diagnosis Date     Chronic lymphocytic thyroiditis      Premenstrual dysphoric disorder      Subclinical hypothyroidism      Thyroid nodule      Past Surgical History:   Procedure Laterality Date     BIOPSY OF SKIN LESION       RESECT TUMOR LOWER EXTREMITY  04/10/2014    Procedure: RESECT TUMOR LOWER EXTREMITY;  Excision Left Fibula Tumor/exostosis;  Surgeon: Casimiro Rios MD;  Location: [facility] OR     WISDOM TOOTH EXTRACTION       Medications  Current Outpatient Medications   Medication Sig Dispense Refill     EPINEPHrine (ANY BX GENERIC EQUIV) 0.3 MG/0.3ML injection 2-pack Inject 0.3 mLs (0.3 mg) into the muscle as needed for anaphylaxis. 2 each 11     letrozole (FEMARA) 2.5 MG tablet Take 1 tablet (2.5 mg) by mouth daily. Start of Day 3 of your menstrual cycle 5 tablet 0     levothyroxine (SYNTHROID/LEVOTHROID) 100 MCG tablet MON to FRI  1 tablet/day; SAT and SUN 1.5 tablet 98 tablet 4       Allergies  Allergies   Allergen Reactions     Peanuts [Nuts] Nausea and Vomiting, Hives and Swelling     Abdominal pain  dizziness     Family History  Family History   Problem Relation Age of Onset     No Known Problems Mother      Diabetes Type 2  Father      Thyroid Disease Maternal Grandfather      Colon Cancer Maternal Grandfather 70     Diabetes Type 1 Paternal Grandmother      Thyroid Disease Paternal Aunt      Skin Cancer No family hx of      Breast Cancer No family hx of      Ovarian Cancer No family hx of      Social History  Social History     Tobacco Use     Smoking status: Never     Smokeless tobacco: Never   Vaping Use     Vaping status: Never Used   Substance Use Topics     Alcohol use: Yes     Alcohol/week: 7.0 standard drinks of alcohol     Types: 7 Standard drinks or equivalent per week     Drug use: No     Physical Exam  There is no height  "or weight on file to calculate BMI.   BP Readings from Last 1 Encounters:   01/20/25 118/78      Pulse Readings from Last 1 Encounters:   05/01/24 76      Resp Readings from Last 1 Encounters:   05/01/24 16      Temp Readings from Last 1 Encounters:   05/01/24 97.4  F (36.3  C) (Temporal)      SpO2 Readings from Last 1 Encounters:   05/01/24 99%      Wt Readings from Last 1 Encounters:   01/20/25 88.5 kg (195 lb)      Ht Readings from Last 1 Encounters:   01/08/25 1.702 m (5' 7\")     GENERAL: pleasant young woman in no distress; I can see from upper trunk up,    SKIN: Visible skin clear. No significant rash, abnormal pigmentation or lesions.  EYES: glasses; Eyes grossly normal to inspection.   NECK: visible goiter is suggested - right slightly larger than left   RESP: No audible wheeze, cough, or  increased work of breathing.    NEURO: Awake, alert, responds appropriately to questions.  Mentation and speech fluent.  PSYCH:affect normal  and appearance well-groomed.    DATA REVIEW   Latest Ref Rng 5/3/2023  10:01 AM 7/11/2023  9:07 AM 5/8/2024  1:36 PM 10/7/2024  11:32 AM 4/25/2025  4:16 PM   ENDO THYROID LABS-[facility]         TSH 0.30 - 4.20 uIU/mL 4.52 (H)  0.53  1.97   2.25    TSH (External) 0.40 - 4.50 mIU/L        FREE T4 0.90 - 1.70 ng/dL 1.12  1.30  1.33   1.31    Sex Hormone Binding Globulin 30 - 135 nmol/L    53        Legend:  (H) High      US THYROID 5/1/2024 11:10 AM  COMPARISON: 5/3/2023  HISTORY: Subclinical hypothyroidism; Thyroid nodule  FINDINGS:   Thyroid parenchyma: Heterogeneous  The right lobe of the thyroid measures: 6.7 x 3.8 x 3.5 cm  The left lobe of the thyroid measures: 4.4 x 1.7 x 1.9 cm  The thyroid isthmus measures: 0.5 cm  Nodule 1:  Location: Mid right thyroid lobe  Size: 3.1 x 3.4 x 3.6 cm  Composition: Solid or almost completely solid (2 points)  Echogenicity: Hyperechoic or isoechoic (1 point)  Shape: Taller than wide (3 points)  Margin: Smooth (0 points)  Echogenic Foci: None or large " comet tail artifact (0 points)  Stability: No significant change in size  TIRADS: TR4 (4-6 points)      Nodule 2:  Location: Inferior left thyroid lobe  Size: 1.1 x 1.0 x 1.2 cm  Composition: Solid or almost completely solid (2 points)  Echogenicity: Hyperechoic or isoechoic (1 point)  Shape: Wider than tall (0 points)  Margin: Smooth (0 points)  Echogenic Foci: None or large comet tail artifact (0 points)  Stability: No significant change in size  TIRADS: TR3 (3 points)      Nodule 3:  Location: Mid left thyroid lobe  Size: 0.5 x 0.3 x 0.7 cm  Composition: Solid or almost completely solid (2 points)  Echogenicity: Hyperechoic or isoechoic (1 point)  Shape: Wider than tall (0 points)  Margin: Smooth (0 points)  Echogenic Foci: None or large comet tail artifact (0 points)  Stability: Increased in conspicuity but not significantly changed in  size since prior  TIRADS: TR3 (3 points)      Nodule 4:  Location: Isthmus  Size: 0.5 x 0.4 x 0.6 cm  Composition: Solid or almost completely solid (2 points)  Echogenicity: Hyperechoic or isoechoic (1 point)  Shape: Wider than tall (0 points)  Margin: Smooth (0 points)  Echogenic Foci: Punctate echogenic foci (3 points)  Stability: Slightly increased in conspicuity but not significant  changed in size since prior  TIRADS: TR4 (4-6 points)      0.5 x 0.5 x 0.9 cm reniform lymph node inferior to the left thyroid  lobe, unchanged.                                                                      Impression:     Bilateral thyroid nodules, as detailed above, not significantly  changed since 5/3/2023, including the previously sampled TIRADS 4  right thyroid lobe nodule which was sampled with benign result.     ACR TI-RADS recommendations  TR2 (2 points) & TR1 (0 points) -No FNA or follow-up  TR3 (3 points) - FNA if ? 2.5cm, follow-up if 1.5 -2.4 cm in 1, 3 and  5 years  TR4 (4-6 points) - FNA if ? 1.5cm, follow-up if 1 -1.4 cm in 1, 2, 3  and 5 years  TR5 (?7 points) - FNA if ?  1cm, follow-up if 0.5 -0.9 cm every year  for 5 years      NORBERTO ROBLES, DO          Virtual Visit Details    Type of service:  Video Visit     Originating Location (pt. Location): Home      Again, thank you for allowing me to participate in the care of your patient.      Sincerely,    Genesis Dasilva MD

## 2025-05-20 NOTE — PROGRESS NOTES
Virtual Visit Details    Type of service:  Video Visit     Originating Location (pt. Location): Home

## 2025-05-23 ENCOUNTER — LAB (OUTPATIENT)
Dept: LAB | Facility: CLINIC | Age: 35
End: 2025-05-23
Payer: COMMERCIAL

## 2025-05-23 DIAGNOSIS — E04.1 THYROID NODULE: ICD-10-CM

## 2025-05-23 DIAGNOSIS — Z33.1 PREGNANCY, INCIDENTAL: ICD-10-CM

## 2025-05-23 DIAGNOSIS — E03.8 SUBCLINICAL HYPOTHYROIDISM: ICD-10-CM

## 2025-05-23 LAB
T4 SERPL-MCNC: 6.6 UG/DL (ref 4.5–11.7)
TSH SERPL DL<=0.005 MIU/L-ACNC: 4.4 UIU/ML (ref 0.3–4.2)

## 2025-05-23 PROCEDURE — 84443 ASSAY THYROID STIM HORMONE: CPT

## 2025-05-23 PROCEDURE — 84436 ASSAY OF TOTAL THYROXINE: CPT

## 2025-05-23 PROCEDURE — 36415 COLL VENOUS BLD VENIPUNCTURE: CPT

## 2025-05-24 ENCOUNTER — RESULTS FOLLOW-UP (OUTPATIENT)
Dept: ENDOCRINOLOGY | Facility: CLINIC | Age: 35
End: 2025-05-24
Payer: COMMERCIAL

## 2025-06-11 ENCOUNTER — TRANSFERRED RECORDS (OUTPATIENT)
Dept: HEALTH INFORMATION MANAGEMENT | Facility: CLINIC | Age: 35
End: 2025-06-11
Payer: COMMERCIAL

## 2025-06-17 ENCOUNTER — ANCILLARY PROCEDURE (OUTPATIENT)
Dept: ULTRASOUND IMAGING | Facility: CLINIC | Age: 35
End: 2025-06-17
Payer: COMMERCIAL

## 2025-06-17 DIAGNOSIS — E04.1 THYROID NODULE: ICD-10-CM

## 2025-06-17 DIAGNOSIS — E03.8 SUBCLINICAL HYPOTHYROIDISM: ICD-10-CM

## 2025-06-19 ENCOUNTER — LAB (OUTPATIENT)
Dept: LAB | Facility: CLINIC | Age: 35
End: 2025-06-19
Payer: COMMERCIAL

## 2025-06-19 DIAGNOSIS — Z33.1 PREGNANCY, INCIDENTAL: ICD-10-CM

## 2025-06-19 DIAGNOSIS — E04.1 THYROID NODULE: ICD-10-CM

## 2025-06-19 DIAGNOSIS — E03.8 SUBCLINICAL HYPOTHYROIDISM: ICD-10-CM

## 2025-06-20 ENCOUNTER — RESULTS FOLLOW-UP (OUTPATIENT)
Dept: ENDOCRINOLOGY | Facility: CLINIC | Age: 35
End: 2025-06-20

## 2025-06-23 ENCOUNTER — TRANSFERRED RECORDS (OUTPATIENT)
Dept: HEALTH INFORMATION MANAGEMENT | Facility: CLINIC | Age: 35
End: 2025-06-23
Payer: COMMERCIAL

## 2025-06-25 ENCOUNTER — VIRTUAL VISIT (OUTPATIENT)
Dept: OBGYN | Facility: CLINIC | Age: 35
End: 2025-06-25
Payer: COMMERCIAL

## 2025-06-25 VITALS — HEIGHT: 67 IN | WEIGHT: 195 LBS | BODY MASS INDEX: 30.61 KG/M2

## 2025-06-25 DIAGNOSIS — Z13.79 GENETIC SCREENING: ICD-10-CM

## 2025-06-25 DIAGNOSIS — O09.91 SUPERVISION OF HIGH RISK PREGNANCY IN FIRST TRIMESTER: Primary | ICD-10-CM

## 2025-06-25 DIAGNOSIS — O21.9 NAUSEA/VOMITING IN PREGNANCY: Primary | ICD-10-CM

## 2025-06-25 DIAGNOSIS — O36.80X0 PREGNANCY WITH INCONCLUSIVE FETAL VIABILITY: ICD-10-CM

## 2025-06-25 PROBLEM — O09.90 SUPERVISION OF HIGH-RISK PREGNANCY: Status: ACTIVE | Noted: 2025-06-25

## 2025-06-25 PROCEDURE — 99207 PR NO CHARGE NURSE ONLY: CPT | Mod: 93

## 2025-06-25 RX ORDER — ONDANSETRON 4 MG/1
4 TABLET, ORALLY DISINTEGRATING ORAL EVERY 8 HOURS PRN
Qty: 30 TABLET | Refills: 1 | Status: SHIPPED | OUTPATIENT
Start: 2025-06-25 | End: 2025-07-15

## 2025-06-25 NOTE — PROGRESS NOTES
SUBJECTIVE:     HPI:    This is a 34 year old female patient,  who presents for her first obstetrical visit.    ANOOP: 2026, by Last Menstrual Period.  She is 9w0d weeks.  Her cycles are regular.  Her last menstrual period was normal.   Since her LMP, she has experienced  nausea, fatigue, and breast tenderness.     Additional History: Thyroid disorder    Have you travelled during the pregnancy?No  Have your sexual partner(s) travelled during the pregnancy?No    HISTORY:   Planned Pregnancy: Yes  Marital Status:   Occupation: Midwife  Living in Household: Spouse-[place]    Past History:  Her past medical history   Past Medical History:   Diagnosis Date    Chronic lymphocytic thyroiditis     Premenstrual dysphoric disorder     Subclinical hypothyroidism     Thyroid nodule    .      She has a history of  first pregnancy    Since her last LMP she denies use of alcohol, tobacco and street drugs.    Past medical, surgical, social and family history were reviewed and updated in [facility].      Current Outpatient Medications   Medication Sig Dispense Refill    ondansetron (ZOFRAN ODT) 4 MG ODT tab Take 1 tablet (4 mg) by mouth every 8 hours as needed for nausea. 30 tablet 1    Prenat w/o A-FeCbGl-DSS-FA-DHA (PNV OB+DHA PO) Take by mouth.      EPINEPHrine (ANY BX GENERIC EQUIV) 0.3 MG/0.3ML injection 2-pack Inject 0.3 mLs (0.3 mg) into the muscle as needed for anaphylaxis. 2 each 11    levothyroxine (SYNTHROID/LEVOTHROID) 100 MCG tablet MON to FRI  1 tablet/day; SAT and SUN 1.5 tablet 98 tablet 4     No current facility-administered medications for this visit.       ROS:   12 point review of systems negative other than symptoms noted below or in the HPI.  Constitutional: Fatigue  Breast: Tenderness  Gastrointestinal: Nausea    Confirmed patient desires delivery at [facility] with [facility] provider.    Nurse phone visit completed. Prenatal book and folder (containing standard  "educational hand-outs and brochures) will be given at the next visit to patient. Information in folder reviewed over the phone. Questions answered. Brochure given on optional screening available to assess chromosomal anomalies. Pt desires NIPS. Pt advised to call the clinic if she has any questions or concerns related to her pregnancy. Prenatal labs future ordered.   Chante Kendall RN on 6/25/2025 at 10:17 AM      No results found for: \"PAP\"    PHQ-9 score:        6/25/2025     9:18 AM   PHQ   PHQ-9 Total Score 3    Q9: Thoughts of better off dead/self-harm past 2 weeks Not at all       Patient-reported                   6/25/2025     9:17 AM   KANCHAN-7 SCORE   Total Score 3 (minimal anxiety)   Total Score 3        Patient-reported           Patient supplied answers from flow sheet for:  Prenatal OB Questionnaire.  Past Medical History  Have you ever recieved care for your mental health? : (!) (Patient-Rptd) Yes  Have you ever been in a major accident or suffered serious trauma?: (Patient-Rptd) No  Within the last year, has anyone hit, slapped, kicked or otherwise hurt you?: (Patient-Rptd) No  In the last year, has anyone forced you to have sex when you didn't want to?: (Patient-Rptd) No    Past Medical History 2   Have you ever received a blood transfusion?: (Patient-Rptd) No  Would you accept a blood transfusion if was medically recommended?: (Patient-Rptd) Yes  Does anyone in your home smoke?: (Patient-Rptd) No   Is your blood type Rh negative?: (Patient-Rptd) Unknown  Have you ever ?: (Patient-Rptd) No  Have you been hospitalized for a nonsurgical reason excluding normal delivery?: (Patient-Rptd) No  Have you ever had an abnormal pap smear?: (Patient-Rptd) No    Past Medical History (Continued)  Do you have a history of abnormalities of the uterus?: (Patient-Rptd) No  Did your mother take BROOKLYN or any other hormones when she was pregnant with you?: (Patient-Rptd) No  Do you have any other problems we have not " "asked about which you feel may be important to this pregnancy?: (Patient-Rptd) No                   OBJECTIVE:     EXAM:  Ht 1.702 m (5' 7\")   Wt 88.5 kg (195 lb)   LMP 04/23/2025   BMI 30.54 kg/m   Body mass index is 30.54 kg/m .  "

## 2025-06-25 NOTE — ASSESSMENT & PLAN NOTE
**HR  FOB: [place]    ANOOP: Jan 28, 2026  BT Placenta:  Sex:   Genetic:  yes    Tdap:          Flu:           GBS:     Problems  F/U next visit Thyroid

## 2025-07-01 LAB
ABO + RH BLD: NORMAL
BLD GP AB SCN SERPL QL: NEGATIVE
SPECIMEN EXP DATE BLD: NORMAL

## 2025-07-02 ENCOUNTER — LAB (OUTPATIENT)
Dept: LAB | Facility: CLINIC | Age: 35
End: 2025-07-02
Payer: COMMERCIAL

## 2025-07-02 DIAGNOSIS — Z13.79 GENETIC SCREENING: ICD-10-CM

## 2025-07-02 DIAGNOSIS — O09.91 SUPERVISION OF HIGH RISK PREGNANCY IN FIRST TRIMESTER: ICD-10-CM

## 2025-07-02 DIAGNOSIS — O09.91 SUPERVISION OF HIGH RISK PREGNANCY IN FIRST TRIMESTER: Primary | ICD-10-CM

## 2025-07-02 LAB
ALBUMIN UR-MCNC: NEGATIVE MG/DL
APPEARANCE UR: CLEAR
BACTERIA #/AREA URNS HPF: ABNORMAL /HPF
BILIRUB UR QL STRIP: NEGATIVE
COLOR UR AUTO: YELLOW
ERYTHROCYTE [DISTWIDTH] IN BLOOD BY AUTOMATED COUNT: 12.6 % (ref 10–15)
EST. AVERAGE GLUCOSE BLD GHB EST-MCNC: 94 MG/DL
GLUCOSE UR STRIP-MCNC: NEGATIVE MG/DL
HBA1C MFR BLD: 4.9 % (ref 0–5.6)
HCT VFR BLD AUTO: 38.6 % (ref 35–47)
HGB BLD-MCNC: 13.3 G/DL (ref 11.7–15.7)
HGB UR QL STRIP: NEGATIVE
KETONES UR STRIP-MCNC: 15 MG/DL
LEUKOCYTE ESTERASE UR QL STRIP: ABNORMAL
MCH RBC QN AUTO: 31.3 PG (ref 26.5–33)
MCHC RBC AUTO-ENTMCNC: 34.5 G/DL (ref 31.5–36.5)
MCV RBC AUTO: 91 FL (ref 78–100)
NITRATE UR QL: NEGATIVE
PH UR STRIP: 7 [PH] (ref 5–7)
PLATELET # BLD AUTO: 219 10E3/UL (ref 150–450)
RBC # BLD AUTO: 4.25 10E6/UL (ref 3.8–5.2)
RBC #/AREA URNS AUTO: ABNORMAL /HPF
RUBV IGG SERPL QL IA: 1.33 INDEX
RUBV IGG SERPL QL IA: POSITIVE
SP GR UR STRIP: 1.01 (ref 1–1.03)
SQUAMOUS #/AREA URNS AUTO: ABNORMAL /LPF
T PALLIDUM AB SER QL: NONREACTIVE
UROBILINOGEN UR STRIP-ACNC: 0.2 E.U./DL
VZV IGG SER QL IA: 4.89 S/CO
VZV IGG SER QL IA: POSITIVE
WBC # BLD AUTO: 6 10E3/UL (ref 4–11)
WBC #/AREA URNS AUTO: ABNORMAL /HPF

## 2025-07-02 PROCEDURE — 81001 URINALYSIS AUTO W/SCOPE: CPT

## 2025-07-02 PROCEDURE — 87389 HIV-1 AG W/HIV-1&-2 AB AG IA: CPT

## 2025-07-02 PROCEDURE — 86901 BLOOD TYPING SEROLOGIC RH(D): CPT

## 2025-07-02 PROCEDURE — 3044F HG A1C LEVEL LT 7.0%: CPT

## 2025-07-02 PROCEDURE — 86780 TREPONEMA PALLIDUM: CPT

## 2025-07-02 PROCEDURE — 86850 RBC ANTIBODY SCREEN: CPT

## 2025-07-02 PROCEDURE — 86787 VARICELLA-ZOSTER ANTIBODY: CPT

## 2025-07-02 PROCEDURE — 87086 URINE CULTURE/COLONY COUNT: CPT

## 2025-07-02 PROCEDURE — 83036 HEMOGLOBIN GLYCOSYLATED A1C: CPT

## 2025-07-02 PROCEDURE — 36415 COLL VENOUS BLD VENIPUNCTURE: CPT

## 2025-07-02 PROCEDURE — 86762 RUBELLA ANTIBODY: CPT

## 2025-07-02 PROCEDURE — 86803 HEPATITIS C AB TEST: CPT

## 2025-07-02 PROCEDURE — 85027 COMPLETE CBC AUTOMATED: CPT

## 2025-07-02 PROCEDURE — 86900 BLOOD TYPING SEROLOGIC ABO: CPT

## 2025-07-02 PROCEDURE — 87340 HEPATITIS B SURFACE AG IA: CPT

## 2025-07-03 LAB
HBV SURFACE AG SERPL QL IA: NONREACTIVE
HCV AB SERPL QL IA: NONREACTIVE
HIV 1+2 AB+HIV1 P24 AG SERPL QL IA: NONREACTIVE

## 2025-07-04 LAB — BACTERIA UR CULT: NORMAL

## 2025-07-14 LAB — SCANNED LAB RESULT: NORMAL

## 2025-07-14 NOTE — PROGRESS NOTES
SUBJECTIVE:      HPI:     This is a 34 year old female patient,  who presents for her first obstetrical visit. Has been struggling with n/v, fatigue and migraines. Has lost 7# since getting pregnant, started Zofran for n/v which has been helpful. Is staying very well hydrated.     Hx hypothyroidism, managed by Endo. On 100 mcg levo on weekdays, 200 mcg on weekends.      ANOOP: 2026, by Last Menstrual Period.  She is 9w0d weeks.  Her cycles are regular.  Her last menstrual period was normal.   Since her LMP, she has experienced  nausea, fatigue, and breast tenderness.      Additional History: Thyroid disorder     Have you travelled during the pregnancy?No  Have your sexual partner(s) travelled during the pregnancy?No     HISTORY:   Planned Pregnancy: Yes  Marital Status:   Occupation: Midwife  Living in Household: Spouse-[place]     Past History:  Her past medical history   Past Medical History        Past Medical History:   Diagnosis Date    Chronic lymphocytic thyroiditis      Premenstrual dysphoric disorder      Subclinical hypothyroidism      Thyroid nodule        .       She has a history of  first pregnancy     Since her last LMP she denies use of alcohol, tobacco and street drugs.     Past medical, surgical, social and family history were reviewed and updated in [facility].        Current Facility-Administered Medications          Current Outpatient Medications   Medication Sig Dispense Refill    ondansetron (ZOFRAN ODT) 4 MG ODT tab Take 1 tablet (4 mg) by mouth every 8 hours as needed for nausea. 30 tablet 1    Prenat w/o A-FeCbGl-DSS-FA-DHA (PNV OB+DHA PO) Take by mouth.        EPINEPHrine (ANY BX GENERIC EQUIV) 0.3 MG/0.3ML injection 2-pack Inject 0.3 mLs (0.3 mg) into the muscle as needed for anaphylaxis. 2 each     levothyroxine (SYNTHROID/LEVOTHROID) 100 MCG tablet MON to FRI  1 tablet/day; SAT and SUN 1.5 tablet 98 tablet 4      No current facility-administered medications for this visit.  "           ROS:   12 point review of systems negative other than symptoms noted below or in the HPI.  Constitutional: Fatigue  Breast: Tenderness  Gastrointestinal: Nausea     Confirmed patient desires delivery at [facility] with the [facility] provider.     Nurse phone visit completed. Prenatal book and folder (containing standard educational hand-outs and brochures) will be given at the next visit to patient. Information in folder reviewed over the phone. Questions answered. Brochure given on optional screening available to assess chromosomal anomalies. Pt desires NIPS. Pt advised to call the clinic if she has any questions or concerns related to her pregnancy. Prenatal labs future ordered.   Chante Kendall RN on 6/25/2025 at 10:17 AM        No results found for: \"PAP\"     PHQ-9 score:         6/25/2025     9:18 AM   PHQ   PHQ-9 Total Score 3    Q9: Thoughts of better off dead/self-harm past 2 weeks Not at all       Patient-reported                          6/25/2025     9:17 AM   KANCHAN-7 SCORE   Total Score 3 (minimal anxiety)   Total Score 3        Patient-reported               Patient supplied answers from flow sheet for:  Prenatal OB Questionnaire.  Past Medical History  Have you ever recieved care for your mental health? : (!) (Patient-Rptd) Yes  Have you ever been in a major accident or suffered serious trauma?: (Patient-Rptd) No  Within the last year, has anyone hit, slapped, kicked or otherwise hurt you?: (Patient-Rptd) No  In the last year, has anyone forced you to have sex when you didn't want to?: (Patient-Rptd) No     Past Medical History 2   Have you ever received a blood transfusion?: (Patient-Rptd) No  Would you accept a blood transfusion if was medically recommended?: (Patient-Rptd) Yes  Does anyone in your home smoke?: (Patient-Rptd) No   Is your blood type Rh negative?: (Patient-Rptd) Unknown  Have you ever ?: (Patient-Rptd) No  Have you been hospitalized for " "a nonsurgical reason excluding normal delivery?: (Patient-Rptd) No  Have you ever had an abnormal pap smear?: (Patient-Rptd) No     Past Medical History (Continued)  Do you have a history of abnormalities of the uterus?: (Patient-Rptd) No  Did your mother take BROOKLYN or any other hormones when she was pregnant with you?: (Patient-Rptd) No  Do you have any other problems we have not asked about which you feel may be important to this pregnancy?: (Patient-Rptd) No          OBJECTIVE:     EXAM:  /72 (BP Location: Right arm, Patient Position: Sitting, Cuff Size: Adult Regular)   Ht 1.702 m (5' 7\")   Wt 85 kg (187 lb 6.4 oz)   LMP 2025   BMI 29.35 kg/m   Body mass index is 29.35 kg/m .    GENERAL: alert and no distress  EYES: Eyes grossly normal to inspection, conjunctivae and sclerae normal  RESP: lungs clear to auscultation  CV: regular rate and rhythm,  no peripheral edema  MS: no gross musculoskeletal defects noted, no edema  SKIN: no suspicious lesions or rashes  NEURO: Normal strength and tone, mentation intact and speech normal  PSYCH: mentation appears normal, affect normal/bright    ASSESSMENT/PLAN:       ICD-10-CM    1. Supervision of high risk pregnancy in first trimester  O09.91       2. Hypothyroidism due to Hashimoto's thyroiditis  E06.3       3. Mild episode of recurrent major depressive disorder  F33.0       4. 12 weeks gestation of pregnancy  Z3A.12       5. Subclinical hypothyroidism  E03.8 TSH     Thyroxine total      6. Thyroid nodule  E04.1 TSH     Thyroxine total      7. Pregnancy, incidental  Z33.1 TSH     Thyroxine total          35 year old , On 2025 patient is 12w1d weeks of pregnancy with ANOOP of 2026, by Last Menstrual Period     consult for US for AMA patients: NA  Genetic Testing already completed, NIPS negative, having a girl!     COUNSELING  Instructed on use of triage nurse line and contacting the on call CNM after hours in an emergency. "   Symptoms of N&V and fatigue usually start to resolve around 12-16 weeks   Reviewed CNM philosophy, call schedule for labor and delivery, and FSH for delivery  1st OB handout given outlining appointment spacing and CNM information  Reviewed exercise and nutrition  Recommend to gain 15-25 pounds with her pregnancy.  Discussed OTC medications. OB med list given  Encouraged patient to take PNV's/DHA  Travel precautions discussed, no air travel after 36 weeks  Reviewed normal new OB labs   Advised to start ASA 81 mg daily for preeclampsia prevention   Thyroid labs drawn today, standing orders in place from Endo    F/U to be addressed next visit:  offer gc/ct, assess N/V, repeat thyroid labs     Problems:   Hypothyroidism: 100 mcg on weekdays, 200 mcg on Sat/Sun. Plan for monthly thyroid labs, standing orders in place from Endo. Any need for dose adjustment to be done by Endo  Hx depression: no meds   N/V: continue with Zofran prn. Offered infusions, declines at this time  TWG: -7#    Will return to the clinic in 4 weeks for her next routine prenatal check.  Will call to be seen sooner if problems arise.    20 minutes spent by me on the date of the encounter doing chart review, history and exam, documentation and further activities per the note    BROOKE Rashid, IVANNA

## 2025-07-15 ENCOUNTER — ANCILLARY PROCEDURE (OUTPATIENT)
Dept: ULTRASOUND IMAGING | Facility: CLINIC | Age: 35
End: 2025-07-15
Payer: COMMERCIAL

## 2025-07-15 DIAGNOSIS — O36.80X0 PREGNANCY WITH INCONCLUSIVE FETAL VIABILITY: ICD-10-CM

## 2025-07-15 PROCEDURE — 76801 OB US < 14 WKS SINGLE FETUS: CPT | Performed by: STUDENT IN AN ORGANIZED HEALTH CARE EDUCATION/TRAINING PROGRAM

## 2025-07-16 ENCOUNTER — PRENATAL OFFICE VISIT (OUTPATIENT)
Dept: MIDWIFE SERVICES | Facility: CLINIC | Age: 35
End: 2025-07-16
Payer: COMMERCIAL

## 2025-07-16 VITALS
DIASTOLIC BLOOD PRESSURE: 72 MMHG | SYSTOLIC BLOOD PRESSURE: 112 MMHG | BODY MASS INDEX: 29.41 KG/M2 | WEIGHT: 187.4 LBS | HEIGHT: 67 IN

## 2025-07-16 DIAGNOSIS — F33.0 MILD EPISODE OF RECURRENT MAJOR DEPRESSIVE DISORDER: ICD-10-CM

## 2025-07-16 DIAGNOSIS — Z3A.12 12 WEEKS GESTATION OF PREGNANCY: ICD-10-CM

## 2025-07-16 DIAGNOSIS — E06.3 HYPOTHYROIDISM DUE TO HASHIMOTO'S THYROIDITIS: ICD-10-CM

## 2025-07-16 DIAGNOSIS — O09.91 SUPERVISION OF HIGH RISK PREGNANCY IN FIRST TRIMESTER: Primary | ICD-10-CM

## 2025-07-16 DIAGNOSIS — E04.1 THYROID NODULE: ICD-10-CM

## 2025-07-16 DIAGNOSIS — E03.8 SUBCLINICAL HYPOTHYROIDISM: ICD-10-CM

## 2025-07-16 DIAGNOSIS — Z36.89 ENCOUNTER FOR FETAL ANATOMIC SURVEY: ICD-10-CM

## 2025-07-16 DIAGNOSIS — Z33.1 PREGNANCY, INCIDENTAL: ICD-10-CM

## 2025-07-16 LAB
T4 SERPL-MCNC: 12.1 UG/DL (ref 4.5–11.7)
TSH SERPL DL<=0.005 MIU/L-ACNC: 1.86 UIU/ML (ref 0.3–4.2)

## 2025-07-16 PROCEDURE — 3074F SYST BP LT 130 MM HG: CPT | Performed by: ADVANCED PRACTICE MIDWIFE

## 2025-07-16 PROCEDURE — 84436 ASSAY OF TOTAL THYROXINE: CPT | Performed by: ADVANCED PRACTICE MIDWIFE

## 2025-07-16 PROCEDURE — 36415 COLL VENOUS BLD VENIPUNCTURE: CPT | Performed by: ADVANCED PRACTICE MIDWIFE

## 2025-07-16 PROCEDURE — 84443 ASSAY THYROID STIM HORMONE: CPT | Performed by: ADVANCED PRACTICE MIDWIFE

## 2025-07-16 PROCEDURE — 99213 OFFICE O/P EST LOW 20 MIN: CPT | Performed by: ADVANCED PRACTICE MIDWIFE

## 2025-07-16 PROCEDURE — 3078F DIAST BP <80 MM HG: CPT | Performed by: ADVANCED PRACTICE MIDWIFE

## 2025-07-16 PROCEDURE — 0500F INITIAL PRENATAL CARE VISIT: CPT | Performed by: ADVANCED PRACTICE MIDWIFE

## 2025-07-16 PROCEDURE — G2211 COMPLEX E/M VISIT ADD ON: HCPCS | Performed by: ADVANCED PRACTICE MIDWIFE

## 2025-07-16 NOTE — PATIENT INSTRUCTIONS
Thank you for coming to see the Midwives at the   [facility]!    Please take prenatal vitamin with DHA once daily during the entire pregnancy.    We will notify you about your labs that were drawn when we get the results back.  If you have MyChart your lab results will be posted there. Someone from the clinic will send you a [employer] message or call you personally with your results.    If you need any refills of medications please call your pharmacy, and they will contact us.    If you have a medical emergency please call 911.    If you have any concerns about today's visit, wish to schedule another appointment, or have an urgent medical concern please call our office at [phone]. You can also make appointments through [employer].    After hours you may also call the clinic number above to be connected with [facility]'s after hours triage nurse. The nurse can page the midwife on call, if needed. There is always a midwife on call 24 hours a day.    Prenatal Care Recommendations:    Before 14 weeks: Dating ultrasound, genetic testing       This ultrasound helps us determine your dates accurately. Noninvasive prenatal testing (genetic screening test) can be drawn anytime after 10 weeks of gestation; this test can also predict the baby's sex.    16 weeks: Optional genetic testing AFP       This testing helps understand your baby's risk for some genetic abnormalities.    19-21 weeks:  Screening anatomy ultrasound       This testing will look for early growth abnormalities, placenta location, and may tell the baby's sex if you wish to find out.    24-27 weeks: One hour diabetes test (GCT) and complete blood count       This test helps identify diabetes of pregnancy or gestational diabetes.  We also look at the iron in your blood and how well your blood clots.    28 weeks: Tetanus shot (Tdap)       This shot helps protect you and your baby from whooping cough.    32 weeks: Respiratory syncytial virus  shot (RSV) (seasonal)       This shot helps protect you and your baby from RSV.    36 weeks and later: Group B Strep test (GBS)       This test helps predict if you need antibiotics in labor to prevent your baby from getting an infection.    Anytime September to April:  Flu shot       This shot helps protect you and your family from the flu.  This is especially important during pregnancy.        The typical schedule after your first visit today you can expect:     Visit 2 - 12-16 weeks  Visit 3 - 20 weeks  Visit 4 - 24 weeks  Visit 5 - 28 weeks  Visit 6 - 30 weeks  Visit 7 - 32 weeks  Visit 8 - 34 weeks  Visit 9 - 36 weeks  Weekly after 36 weeks until delivery.        Any time during or after your pregnancy you may experience increased depression and/or mood changes.    We are here to support you. Please contact us if you are:  Feeling anxious  Overwhelmed or sad   Trouble sleeping  Crying uncontrollably  Trouble caring for yourself or baby.  Any thoughts of hurting yourself, your baby, or anyone else    If anything comes up between your visits or you have concerns please don't hesitate to contact us.    Secure access to your medical record:  Use [employer] (secure email communication and access to your chart) to send your primary care provider a message or make an appointment. Ask someone in our office to sign up for [employer]. To log on to [facility] or for more information in [employer] please visit the website at [email or web address].       Certified Nurse Midwife (CNM) Team    Avelina COX, IVANNA COX, IVANNA Madrid APRMERE, IVANNA, [facility]-BC  Lisa Valle DNP, APRN, IVANNA Hawk DNP, APRN, IVANNA Gutierrez DNP, APRN, ROBM    Link to Midwifery Care website: [email or web address]      Again, thank you for choosing the midwives at [facility].  We are excited to be a part of your pregnancy! Please let us know how we can best  partner with you to improve your and your family's health.     Remedies for nausea and vomiting with pregnancy    Eat small frequent meals every 2-3 hours if possible and have snacks, a lot of nausea occurs when you have an empty stomach    Avoid food at extremes of temperature and drinks with carbonation.    Eat foods that appeal to you avoid trigger foods, avoid fats and spicy foods.    Avoid liquids with foods.  Drink liquids 30-60 minutes before or after eating and sip slowly.    Bread, pasta, crackers, potatoes, and rice tend to be tolerated the best.    Don't worry about what you eat in the first 3 months, it is more important that you can eat and keep it down!    Try ginger ale or ginger tea    Before rising in the morning, eat a small amount of crackers or dry toast.    Peppermint tea    Ginger is a herbal remedy for nausea and you can use it in any form, there are ginger tablets you can purchase, tea, candies, and pregnancy pops. If you choose to take a supplement it is around 1000 mg a day in divided doses.     You may also try doxylamine (Unisom) 12.5-25 mg at bedtime with Vitamin B6 25 mg three times a day.  The combination of these medications works well and it is available OTC. It can take up to a week to work so give it some time.     Benadryl (diphenhydramine) 1-2 tabs (25-50 mg) every 4-6 hours (max of 12 tabs in 24 hrs) or Dramamine (dimenhydrinate) 1- 2 tabs () mg by mouth every 4-6 hours (Max of 8 tabs in 24 hrs). Both of these medication may cause some drowsiness but are commonly used for motion sickness and nausea prevention    Other things that may help include an Accupressure bands or sea bands available OTC at many pharmacies    Acupuncture    If these methods fail there are many prescriptions that we can try    If you begin to vomit more than 5 or 6 times a day and feel that you are unable to keep anything down, call the [facility] at [phone]

## 2025-08-15 PROBLEM — Z33.1 PREGNANCY, INCIDENTAL: Status: RESOLVED | Noted: 2025-05-20 | Resolved: 2025-08-15

## 2025-08-16 ENCOUNTER — HEALTH MAINTENANCE LETTER (OUTPATIENT)
Age: 35
End: 2025-08-16

## 2025-08-23 DIAGNOSIS — E04.1 THYROID NODULE: ICD-10-CM

## 2025-08-23 DIAGNOSIS — E03.8 SUBCLINICAL HYPOTHYROIDISM: ICD-10-CM

## 2025-08-23 RX ORDER — LEVOTHYROXINE SODIUM 100 UG/1
TABLET ORAL
Qty: 108 TABLET | Refills: 4 | Status: SHIPPED | OUTPATIENT
Start: 2025-08-23

## (undated) RX ORDER — LIDOCAINE HYDROCHLORIDE 10 MG/ML
INJECTION, SOLUTION EPIDURAL; INFILTRATION; INTRACAUDAL; PERINEURAL
Status: DISPENSED
Start: 2022-03-11